# Patient Record
Sex: FEMALE | Race: WHITE
[De-identification: names, ages, dates, MRNs, and addresses within clinical notes are randomized per-mention and may not be internally consistent; named-entity substitution may affect disease eponyms.]

---

## 2017-01-25 NOTE — ECWPNPC
PATIENT NAME: MOR SANCHEZ

: 1965

GENDER: FEMALE

MRN: [id number]

VISIT DATE: 01/10/2017

DISCHARGE DATE: 01/10/17 1618

VISIT LOCKED DATE TIME: 

PHYSICIAN: BIGG THAPA 

PHYSICIAN PAGER NO: [phone]

RESOURCE: BIGG THAPA 

 

           

           

REASON FOR APPOINTMENT

           

          1. RIGHT KNEE

           

HISTORY OF PRESENT ILLNESS

           

      HISTORY OF PRESENT ILLNESS:

      PAIN

           

           

          THE PATIENT DESCRIBES THE PAIN...

           

      FALL RISK SCREENING:

      SCREENING

           

           

          :NO FALLS IN THE PAST YEAR

           

      TODAY'S VISIT:

      NOTES:

          RATES PAIN TODAY AS 2/10. STATES PAIN HAS IMPROVED AS SHE HAS

          BEEN RESTING THE KNEE MORE AND HAS NOT BEEN BUCYCLING. SAW DR CASTANEDA WHO FEELS SHE MAY HAVE DEVELOPED A TENDONITIS IN THE RIGHT

          KNEE/QUAD AREA. RATES HER PAIN TODAY AS 2/10. DESCRIBES IT AS

          INTERMITTTANT AND BURNING...

           

CURRENT MEDICATIONS

           

          TAKING MULTIVITAMINS OTC TABLET 1 TABLET ORALLY ONCE A DAY

          TAKING BIOTIN OTC TABLET 1 TABLET ORALLY ONCE A DAY

          TAKING FLONASE 50 MCG/ACT SUSPENSION 1 PUFF IN EACH NOSTRIL

          NASALLY ONCE A DAY AS NEEDED

          TAKING PENLAC 8 % SOLUTION 1 DROP TO AFFECTED TOE NAILS DAILY,

          CLEAN OFF WITH ALCOHOL EVERY 7 DAYS EXTERNALLY ONCE A DAY, NOTES:

          1 WEEK

          TAKING CLARITIN 10 MG TABLET 1 TABLET ORALLY ONCE A DAY

          TAKING ACYCLOVIR 400 MG TABLET 1 TABLET ORALLY TWICE A DAY

          TAKING LEVOTHYROXINE SODIUM 112 MCG TABLET 1 TABLET ORALLY ONCE A

          DAY

          TAKING OMEPRAZOLE 20MG 20MG TABLET 1 TAB BEFORE MEAL ORAL ONCE A

          DAY

          TAKING BLACK COHOSH 40 MG CAPSULE ORALLY

          TAKING ZOFRAN ODT 4 MG TABLET DISPERSIBLE 1 TABLET ON THE TONGUE

          AND ALLOW TO DISSOLVE ORALLY EVERY 8 HRS PRN NAUSEA

          TAKING ASPIRIN CHILDRENS 81 MG TABLET CHEWABLE 1 TABLET ORALLY

          ONCE A DAY

          TAKING PERCOCET 5-325 MG TABLET 1 TABLET ORALLY ONCE PER DAY

          MDD=1 FOR PAIN AS NEEDED

          TAKING VALTREX 500MG TABLET 1 TAB PLANNED PARENTHOOD ORALLY EVERY

          12 HOURS

          NOT-TAKING PROBIOTIC OTC CAPSULE 1 TABLET ORALLY ONCE A DAY,

          NOTES: 1 WEEK

          NOT-TAKING VALACYCLOVIR  MG TABLET TAKE ONE TABLET BY

          MOUTH TWO TIMES A DAY

          NOT-TAKING NAPROXEN 500 MG TABLET 1 TABLET AS NEEDED ORALLY EVERY

          12 HOURS

          NOT-TAKING PREMPRO 0.625-2.5 MG TABLET 1 TABLET ORALLY ONCE A DAY

          NOT-TAKING MOBIC 7.5 MG TABLET 1 TABLET ORALLY TWICE/DAY

          NOT-TAKING ORTHO TRI-CYCLEN LO 0.025 MG TABLET 1 TAB PLANNED

          PARENTHOOD ORALLY ONCE A DAY, NOTES: 0700

          MEDICATION LIST REVIEWED AND RECONCILED WITH THE PATIENT

           

PAST MEDICAL HISTORY

           

          HYPOTHYROIDISM

          ESOPHAGEAL REFLUX

          HS 2

          DEPRESSION/ANXIETY

          SUBSTANCE ABUSE

          BARTHOLIN'S GLAND CYST

          SKIN CA NOSE--? BCCA

          ECHO 5/15: NORMAL (EF75%, NO ABNL)

          HOLTER 7/15: NORMAL, WAS IN NSR DURING SYMPTOMS

          ABPM 8/15:MEAN SBP 130S

          +RF , MILD RA SX (SEE  NOTE)

          DERMOID CYST RT PELVIS CT 

          SJOGREN'S

           

ALLERGIES

           

          DEMEROL: NAUSEA/VOMITING: ALLERGY

          REGLAN: EKG ABNORMALITIES: ALLERGY

          PREMPRO: PALPITATIONS: SIDE EFFECTS

           

SOCIAL HISTORY

           

          GENERAL:

           

          TOBACCO USE

          ARE YOU A:NONSMOKER

           

           

          LEARNING BARRIERS / SPECIAL NEEDS ORIENTED TO PLAN OF CARE:

          PATIENT, PAIN MANAGEMENT PATIENT, ORIENTED TO PLAN OF CARE:

          PATIENT, PAIN MANAGEMENT PATIENT.

           

           

          NEW PATIENT PAIN DIARY

          TODAY'S VISITNOTES

          FROM 0-10, WHAT LEVEL IS YOUR PAIN TODAY?0

           

           

          PAIN CLINIC PFS, CLERGY, PUBLIC HEALTH REFERRALS

          PFS REFERRAL NEEDED?NO

          CLERGY REFERRAL NEEDED?NO

          PUBLIC HEALTH REFERRAL NEEDED?NO

          WAS THE PROVIDER NOTIFIED OF ANY PERTINENT INFO?NO

          PFS REFERRAL NEEDED?NO

          CLERGY REFERRAL NEEDED?NO

          PUBLIC HEALTH REFERRAL NEEDED?NO

          WAS THE PROVIDER NOTIFIED OF ANY PERTINENT INFO?NO

           

REVIEW OF SYSTEMS

           

      CONSTITUTIONAL:

           

          ANY CHANGE IN YOUR MEDICAL CONDITION? NO . CHILLS NO . FEVER NO .

           

      INFECTION:

           

          DO YOU HAVE NEW INFECTIONS? NO . DO YOU HAVE HISTORY OF MRSA? NO

          .

           

      MUSCULOSKELETAL:

           

          ANY NEW PATTERNS OF PAIN OR NUMBNESS? NO .

           

      GASTROENTEROLOGY:

           

          ANY NEW CHANGE IN BOWEL CONTROL? NO .

           

      GENITOURINARY:

           

          ANY NEW CHANGE IN BLADDER CONTROL? NO . IS THERE A CHANCE YOU

          COULD BE PREGNANT? NO .

           

      HEMATOLOGY/LYMPH:

           

          DO YOU TAKE ANY BLOOD THINNERS? (FOR EXAMPLE- COUMADIN, PLAVIX,

          AGGRENOX, PLATEL, PRADAXA, OR XARELTO) NO . WHEN WAS YOUR LAST

          DOSE? DATE: TIME: .

           

      NEUROLOGY:

           

          HAVE YOU FALLEN IN THE PAST 6 MONTHS? NO . ANY NEW EXTREMITY

          NUMBNESS OR WEAKNESS? NO .

           

      CARDIOLOGY:

           

          DO YOU HAVE A PACEMAKER OR DEFIBRILLATOR? NO . CHEST PAIN PATIENT

          DENIES .

           

      RESPIRATORY:

           

          HAVE YOU BEEN SICK IN THE PAST WEEK? NO . FEVER NO . FLU LIKE

          SYMPTOMS? NO . COUGH NO .

           

      INTEGUMENTARY:

           

          DO YOU HAVE ANY RASHES OR OPEN SORES? NO .

           

      ALLERGIC/IMMUNO:

           

          ARE YOU ALLERGIC TO SHELLFISH OR IV DYE? NO . ANY NEW ALLERGIES?

          NO .

           

      PSYCHIATRIC:

           

          DO YOU HAVE THOUGHTS OF HURTING YOURSELF OR SOMEONE ELSE? NO .

          ARE YOU ABUSED, NEGLECTED, OR IN AN UNSAFE ENVIRONMENT? NO .

           

      ENDOCRINOLOGY:

           

          ARE YOU DIABETIC? NO .

           

      OTHER:

           

          DO YOU NEED ANY PRESCRIPTIONS? YES PERCOCET . IF YES, PLEASE

          LIST: ____ . ANY NEW PROBLEMS WITH YOUR MEDICATIONS? NO . WHEN

          DID YOU LAST EAT? ____ . WHEN DID YOU LAST DRINK? ____ . WHAT DID

          YOU LAST DRINK? ____ . NAME OF PERSON DRIVING YOU HOME? ____ . DO

          YOU HAVE ANY OTHER QUESTIONS OR CONCERNS NO .

           

      FEMALE REPRODUCTIVE:

           

          GYNECOLOGIST WILL BE HAVING A HYSTERECTOMY IN 2017. .

           

      REVIEWED BY:

           

          PROVIDER: BIGG HERNADEZ .

           

VITAL SIGNS

           

           LBS, HT 63 IN, BMI 20.19 INDEX, /50 MM HG, HR 88

          /MIN, RR 16 /MIN, TEMP 98.2 F, OXYGEN SAT % 96, SAFE IN ENV?

          (Y/N) Y, REVIEWED BY: .

           

EXAMINATION

           

      GENERAL EXAMINATION:

          PSYCHALERT , ORIENTED X 3 , APPROPRIATE MOOD AND AFFECT,

          FRUSTRATED .

           

          LUNGS:CLEAR TO AUSCULTATION BILATERALLY.

           

          HEART:HEART RATE REGULAR.

           

          MUSCULOSKELETAL:PALPATION: POSITIVE FOR PAIN OVER L/S SPINE.

          POSITIVE FOR PAIN OVER L/S PARSPINALS, TRIGGER POINTS:, ELICITED

          WITH PALPATION OVER LUMBAR PARAVERTEBRAL MUSCLES AND INTO THE

          SECRUM. RESTRICTION OF ROM IN THIS AREA. GAIT ANTALGIC.

           

          JOINTS:RIGHT , KNEE , TENDER, WITH RANGE OF MOTION . NO WARMTH OR

          SWELLING NOTED TODAY.

           

ASSESSMENTS

           

          RHEUMATOID FACTOR POSITIVE - R76.8 (PRIMARY)

           

          MYALGIA - M79.1

           

          KNEE PAIN, RIGHT - M25.561

           

TREATMENT

           

      KNEE PAIN, RIGHT

          REFILL PERCOCET TABLET, 5-325 MG, 1 TABLET, ORALLY, ONCE PER DAY

          MDD=1 FOR PAIN AS NEEDED, 30 DAYS, 20, REFILLS 0

          NOTES: STRETCH BEFORE BIKING. ICE TO KNEE AS NEEDED.

           

PROCEDURE CODES

           

           ESTABILISHED PATIENT [facility] FACILITY CHARGE

           

FOLLOW UP

           

          LATE MARCH

           

 

ELECTRONICALLY SIGNED BY LUCA LYNN ON

          2017 AT 02:38 PM EST

           

           

           

 

DISCLAIMER :

THIS IS A VISIT SUMMARY EXTRACTED FROM THE [employer] CHART.

IT IS NOT A COPY OF THE [employer] PROGRESS NOTE.

DESMOND

## 2017-03-25 NOTE — ECWPNPC
PATIENT NAME: MOR SANCHEZ

: 1965

GENDER: FEMALE

MRN: [id number]

VISIT DATE: 2017

DISCHARGE DATE: 17 1506

VISIT LOCKED DATE TIME: 

PHYSICIAN: BIGG THAPA 

PHYSICIAN PAGER NO: [phone]

RESOURCE: BIGG THAPA 

 

           

           

REASON FOR APPOINTMENT

           

          1. RIGHT KNEE

           

HISTORY OF PRESENT ILLNESS

           

      HISTORY OF PRESENT ILLNESS:

      PAIN

           

           

          THE PATIENT DESCRIBES THE PAIN...

           

      FALL RISK SCREENING:

      SCREENING

           

           

          :NO FALLS IN THE PAST YEAR

           

      TODAY'S VISIT:

      NOTES:

          RATES PAIN TODAY AS 5/10. PAIN CENTERED ACROSS LOW BACK, RIGHT

          SHOULDERBLADE AND ACROSS NECK/SHOULDER GIRDLE. NOTES SHOULDERS

          ARE STIFF. KNEES ARE NOT TOO PAINFUL - HAS NOT BEEN BIKING AS

          BEFORE. .

           

CURRENT MEDICATIONS

           

          TAKING MULTIVITAMINS OTC TABLET 1 TABLET ORALLY ONCE A DAY

          TAKING BIOTIN OTC TABLET 1 TABLET ORALLY ONCE A DAY

          TAKING FLONASE 50 MCG/ACT SUSPENSION 1 PUFF IN EACH NOSTRIL

          NASALLY ONCE A DAY AS NEEDED

          TAKING PENLAC 8 % SOLUTION 1 DROP TO AFFECTED TOE NAILS DAILY,

          CLEAN OFF WITH ALCOHOL EVERY 7 DAYS EXTERNALLY ONCE A DAY, NOTES:

          1 WEEK

          TAKING CLARITIN 10 MG TABLET 1 TABLET ORALLY ONCE A DAY

          TAKING LEVOTHYROXINE SODIUM 88 MCG TABLET 1 TABLET ORALLY ONCE A

          DAY

          TAKING BLACK COHOSH 40 MG CAPSULE ORALLY DAILY

          TAKING ZOFRAN ODT 4 MG TABLET DISPERSIBLE 1 TABLET ON THE TONGUE

          AND ALLOW TO DISSOLVE ORALLY EVERY 8 HRS PRN NAUSEA

          TAKING ASPIRIN CHILDRENS 81 MG TABLET CHEWABLE 1 TABLET ORALLY

          ONCE A DAY

          TAKING PERCOCET 5-325 MG TABLET 1 TABLET ORALLY ONCE PER DAY

          MDD=1 FOR PAIN AS NEEDED

          TAKING ACYCLOVIR 400 MG TABLET 1 TABLET ORALLY TWICE A DAY

          TAKING VALTREX 500MG TABLET 1 TAB PLANNED PARENTHOOD ORALLY BID

          TAKING OMEPRAZOLE 20MG 20MG TABLET 1 TAB BEFORE MEAL ORAL ONCE A

          DAY

          TAKING ACIDOPHILUS - TABLET 1 TAB ORALLY DAILY

          NOT-TAKING OMEPRAZOLE 20 MG CAPSULE DELAYED RELEASE TAKE ONE

          CAPSULE BY MOUTH EVERY DAY BEFORE A MEAL

          MEDICATION LIST REVIEWED AND RECONCILED WITH THE PATIENT

           

PAST MEDICAL HISTORY

           

          HYPOTHYROIDISM

          ESOPHAGEAL REFLUX

          HS 2

          DEPRESSION/ANXIETY

          SUBSTANCE ABUSE

          BARTHOLIN'S GLAND CYST

          SKIN CA NOSE--? BCCA

          ECHO 5/15: NORMAL (EF75%, NO ABNL)

          HOLTER 7/15: NORMAL, WAS IN NSR DURING SYMPTOMS

          ABPM 8/15:MEAN SBP 130S

          +RF , MILD RA SX (SEE  NOTE)

          DERMOID CYST RT PELVIS CT 2016

          SJOGREN'S

           

ALLERGIES

           

          DEMEROL: NAUSEA/VOMITING: ALLERGY

          REGLAN: EKG ABNORMALITIES: ALLERGY

          PREMPRO: PALPITATIONS: SIDE EFFECTS

           

SURGICAL HISTORY

           

          TONSILLECTOMY

          COLPOSCOPY

          LEEP -(PLANNED PARENTHOOD) 

          HYSTERECTOMY 17

           

SOCIAL HISTORY

           

          GENERAL:

           

          TOBACCO USE

          ARE YOU A:NONSMOKER

           

           

          LEARNING BARRIERS / SPECIAL NEEDS ORIENTED TO PLAN OF CARE:

          PATIENT, PAIN MANAGEMENT PATIENT, ORIENTED TO PLAN OF CARE:

          PATIENT, PAIN MANAGEMENT PATIENT.

           

           

          NEW PATIENT PAIN DIARY

          TODAY'S VISITNOTES

          FROM 0-10, WHAT LEVEL IS YOUR PAIN TODAY?0

           

           

          PAIN CLINIC PFS, CLERGY, PUBLIC HEALTH REFERRALS

          PFS REFERRAL NEEDED?NO

          CLERGY REFERRAL NEEDED?NO

          PUBLIC HEALTH REFERRAL NEEDED?NO

          WAS THE PROVIDER NOTIFIED OF ANY PERTINENT INFO?NO

          PFS REFERRAL NEEDED?NO

          CLERGY REFERRAL NEEDED?NO

          PUBLIC HEALTH REFERRAL NEEDED?NO

          WAS THE PROVIDER NOTIFIED OF ANY PERTINENT INFO?NO

           

REVIEW OF SYSTEMS

           

      CONSTITUTIONAL:

           

          ANY CHANGE IN YOUR MEDICAL CONDITION? YES, HYSTERECTOMY 17 .

          CHILLS NO . FEVER NO .

           

      INFECTION:

           

          DO YOU HAVE NEW INFECTIONS? NO . DO YOU HAVE HISTORY OF MRSA? NO

          .

           

      MUSCULOSKELETAL:

           

          ANY NEW PATTERNS OF PAIN OR NUMBNESS? NO .

           

      GASTROENTEROLOGY:

           

          ANY NEW CHANGE IN BOWEL CONTROL? NO .

           

      GENITOURINARY:

           

          ANY NEW CHANGE IN BLADDER CONTROL? NO . IS THERE A CHANCE YOU

          COULD BE PREGNANT? NO .

           

      HEMATOLOGY/LYMPH:

           

          DO YOU TAKE ANY BLOOD THINNERS? (FOR EXAMPLE- COUMADIN, PLAVIX,

          AGGRENOX, PLATEL, PRADAXA, OR XARELTO) NO . WHEN WAS YOUR LAST

          DOSE? DATE: TIME: .

           

      NEUROLOGY:

           

          HAVE YOU FALLEN IN THE PAST 6 MONTHS? NO . ANY NEW EXTREMITY

          NUMBNESS OR WEAKNESS? NO .

           

      CARDIOLOGY:

           

          DO YOU HAVE A PACEMAKER OR DEFIBRILLATOR? NO .

           

      RESPIRATORY:

           

          HAVE YOU BEEN SICK IN THE PAST WEEK? NO . FEVER NO . FLU LIKE

          SYMPTOMS? NO . COUGH NO .

           

      INTEGUMENTARY:

           

          DO YOU HAVE ANY RASHES OR OPEN SORES? NO .

           

      ALLERGIC/IMMUNO:

           

          ARE YOU ALLERGIC TO SHELLFISH OR IV DYE? NO . ANY NEW ALLERGIES?

          NO .

           

      PSYCHIATRIC:

           

          DO YOU HAVE THOUGHTS OF HURTING YOURSELF OR SOMEONE ELSE? NO .

          ARE YOU ABUSED, NEGLECTED, OR IN AN UNSAFE ENVIRONMENT? NO .

           

      ENDOCRINOLOGY:

           

          ARE YOU DIABETIC? NO .

           

      OTHER:

           

          DO YOU NEED ANY PRESCRIPTIONS? YES . IF YES, PLEASE LIST:

          OXYCODONE 5/325 MG . ANY NEW PROBLEMS WITH YOUR MEDICATIONS? NO .

          WHEN DID YOU LAST EAT? ____ . WHEN DID YOU LAST DRINK? ____ .

          WHAT DID YOU LAST DRINK? ____ . NAME OF PERSON DRIVING YOU HOME?

          ____ . DO YOU HAVE ANY OTHER QUESTIONS OR CONCERNS YES, WOULD

          LIKE TO SCHEDULE PAIN INJECTION IN BACK .

           

      FEMALE REPRODUCTIVE:

           

          GYNECOLOGIST HYSTERECTOMY 17 - HAD 3 LARGE CYSTS ON OVARY AND

          ONE UNDER THE BLADDER. HAS HAS SOME SEVERE HOT FLASHES. IS

          CONSIDERING HRT .

           

      REVIEWED BY:

           

          PROVIDER: BIGG HERNADEZ .

           

VITAL SIGNS

           

          .2 LBS, HT 63 IN, BMI 20.40 INDEX, /74 MM HG, HR 94

          /MIN, RR 16 /MIN, TEMP 99.1 F, OXYGEN SAT % 99%, NA INITIALS SC

          14:08, REVIEWED BY: MAKI.

           

EXAMINATION

           

      GENERAL EXAMINATION:

          PSYCHALERT , ORIENTED X 3 , APPROPRIATE MOOD AND AFFECT,

          FRUSTRATED .

           

          LUNGS:CLEAR TO AUSCULTATION BILATERALLY.

           

          HEART:HEART RATE REGULAR.

           

          MUSCULOSKELETAL:PALPATION: POSITIVE FOR PAIN OVER L/S SPINE.

          POSITIVE FOR PAIN OVER L/S PARSPINALS, TRIGGER POINTS:, ELICITED

          WITH PALPATION OVER LUMBAR PARAVERTEBRAL MUSCLES AND INTO THE

          SECRUM. RESTRICTION OF ROM IN THIS AREA. GAIT ANTALGIC.

           

          JOINTS:RIGHT , KNEE , TENDER, WITH RANGE OF MOTION . NO WARMTH OR

          SWELLING NOTED TODAY.

           

ASSESSMENTS

           

          MYALGIA - M79.1 (PRIMARY)

           

          CERVICALGIA - M54.2

           

TREATMENT

           

      MYALGIA

          REFILL PERCOCET TABLET, 5-325 MG, 1 TABLET, ORALLY, ONCE PER DAY

          MDD=1 FOR PAIN AS NEEDED, 30 DAYS, 20, REFILLS 0

          TRIGGER POINT 3 + BIGG CAPPS 3/21/2017 2:52:26 PM >

          MID AND LOW BACK BILATERAL

          NOTES: EXERCISE AS TOLERATED.

          CLINICAL NOTES: ISTOP REGISTRY REVIEWED AND DEMNOSTRATES

          COMPLLIANCE. BRINGS IN MEDICATIONS WHICH IS APPROPRIATE FOR WHAT

          WAS DISPENSED. RECENT URINE TOXICOLOGY REVIEWED. NO UNAUTHORIZED

          MEDICATIONS. NO ILLICIT SUBSTANCES AND PRESCRIBED MEDICATIONS

          WERE PRESENT.

           

PROCEDURE CODES

           

           ESTABILISHED PATIENT [facility] CHARGE

           

DISPOSITION & COMMUNICATION

           

FOLLOW UP

           

          AFTER INJECTION (REASON: CHECK AUTH FOR TPI LOW BACK)

           

 

ELECTRONICALLY SIGNED BY LUCA LYNN ON

          2017 AT 06:38 PM EDT

           

           

           

 

DISCLAIMER :

THIS IS A VISIT SUMMARY EXTRACTED FROM THE [employer] CHART.

IT IS NOT A COPY OF THE [employer] PROGRESS NOTE.

DESMOND

## 2017-04-24 NOTE — ECWPNPC
PATIENT NAME: MOR SANCHEZ

: 1965

GENDER: FEMALE

MRN: [id number]

VISIT DATE: 2017

DISCHARGE DATE: 17 1621

VISIT LOCKED DATE TIME: 

PHYSICIAN: RON RUSSELL  

PHYSICIAN PAGER NO: [phone]

RESOURCE: RON RUSSELL  

 

           

           

REASON FOR APPOINTMENT

           

          1. TPI

           

HISTORY OF PRESENT ILLNESS

           

      HISTORY OF PRESENT ILLNESS:

      PAIN

           

           

          THE PATIENT DESCRIBES THE PAIN...

           

      FALL RISK SCREENING:

      SCREENING

           

           

          :NO FALLS IN THE PAST YEAR

           

CURRENT MEDICATIONS

           

          TAKING MULTIVITAMINS OTC TABLET 1 TABLET ORALLY ONCE A DAY,

          NOTES: 121

          TAKING BIOTIN OTC TABLET 1 TABLET ORALLY ONCE A DAY, NOTES: 730

          TAKING FLONASE 50 MCG/ACT SUSPENSION 1 PUFF IN EACH NOSTRIL

          NASALLY ONCE A DAY AS NEEDED, NOTES: 730

          TAKING CLARITIN 10 MG TABLET 1 TABLET ORALLY ONCE A DAY, NOTES:

          17@2100

          TAKING LEVOTHYROXINE SODIUM 88 MCG TABLET 1 TABLET ORALLY ONCE A

          DAY, NOTES: 730

          TAKING BLACK COHOSH 40 MG CAPSULE ORALLY DAILY, NOTES: 730

          TAKING ZOFRAN ODT 4 MG TABLET DISPERSIBLE 1 TABLET ON THE TONGUE

          AND ALLOW TO DISSOLVE ORALLY EVERY 8 HRS PRN NAUSEA, NOTES: 1

          MONTH AGO

          TAKING ASPIRIN CHILDRENS 81 MG TABLET CHEWABLE 1 TABLET ORALLY

          ONCE A DAY, NOTES: 17@2200

          TAKING ACYCLOVIR 400 MG TABLET 1 TABLET ORALLY TWICE A DAY,

          NOTES: 730

          TAKING VALTREX 500MG TABLET 1 TAB PLANNED PARENTHOOD ORALLY BID,

          NOTES: 1 WEEK

          TAKING OMEPRAZOLE 20MG 20MG TABLET 1 TAB BEFORE MEAL ORAL ONCE A

          DAY, NOTES: 

          TAKING ACIDOPHILUS - TABLET 1 TAB ORALLY DAILY, NOTES: 730

          TAKING PERCOCET 5-325 MG TABLET 1 TABLET ORALLY ONCE PER DAY

          MDD=1 FOR PAIN AS NEEDED, NOTES: 2 DAYS AGO

          TAKING PENLAC 8% SOLUTION 1 DROP TO AFFECTED TOE NAILS DAILY,

          CLEAN OFF WITH ALCOHOL EVERY 7 DAYS EXTERNALLY ONCE A DAY, NOTES:

          1 MONTH AGO

          NOT-TAKING OMEPRAZOLE 20 MG CAPSULE DELAYED RELEASE TAKE ONE

          CAPSULE BY MOUTH EVERY DAY BEFORE A MEAL

          MEDICATION LIST REVIEWED AND RECONCILED WITH THE PATIENT

           

PAST MEDICAL HISTORY

           

          HYPOTHYROIDISM

          ESOPHAGEAL REFLUX

          HS 2

          DEPRESSION/ANXIETY

          SUBSTANCE ABUSE

          BARTHOLIN'S GLAND CYST

          SKIN CA NOSE--? BCCA

          ECHO 5/15: NORMAL (EF75%, NO ABNL)

          HOLTER 7/15: NORMAL, WAS IN NSR DURING SYMPTOMS

          ABPM 8/15:MEAN SBP 130S

          +RF , MILD RA SX (SEE  NOTE)

          DERMOID CYST RT PELVIS CT 2016

          SJOGREN'S

           

ALLERGIES

           

          DEMEROL: NAUSEA/VOMITING: ALLERGY

          REGLAN: EKG ABNORMALITIES: ALLERGY

          PREMPRO: PALPITATIONS: SIDE EFFECTS

           

SOCIAL HISTORY

           

          GENERAL:

           

          PAIN CLINIC PFS, CLERGY, PUBLIC HEALTH REFERRALS

          CLERGY REFERRAL NEEDED?NO

          WAS THE PROVIDER NOTIFIED OF ANY PERTINENT INFO?NO

          PFS REFERRAL NEEDED?NO

          PUBLIC HEALTH REFERRAL NEEDED?NO

           

           

          PATIENT: ____.

           

REVIEW OF SYSTEMS

           

      CONSTITUTIONAL:

           

          ANY CHANGE IN YOUR MEDICAL CONDITION? NO . CHILLS NO . FEVER NO .

           

      INFECTION:

           

          DO YOU HAVE NEW INFECTIONS? NO . DO YOU HAVE HISTORY OF MRSA? NO

          .

           

      MUSCULOSKELETAL:

           

          ANY NEW PATTERNS OF PAIN OR NUMBNESS? NO .

           

      GASTROENTEROLOGY:

           

          ANY NEW CHANGE IN BOWEL CONTROL? NO .

           

      GENITOURINARY:

           

          ANY NEW CHANGE IN BLADDER CONTROL? NO . IS THERE A CHANCE YOU

          COULD BE PREGNANT? NO .

           

      HEMATOLOGY/LYMPH:

           

          DO YOU TAKE ANY BLOOD THINNERS? (FOR EXAMPLE- COUMADIN, PLAVIX,

          AGGRENOX, PLATEL, PRADAXA, OR XARELTO) NO . WHEN WAS YOUR LAST

          DOSE? DATE: TIME: .

           

      NEUROLOGY:

           

          HAVE YOU FALLEN IN THE PAST 6 MONTHS? NO . ANY NEW EXTREMITY

          NUMBNESS OR WEAKNESS? NO .

           

      CARDIOLOGY:

           

          DO YOU HAVE A PACEMAKER OR DEFIBRILLATOR? NO .

           

      RESPIRATORY:

           

          HAVE YOU BEEN SICK IN THE PAST WEEK? NO . FEVER NO . FLU LIKE

          SYMPTOMS? NO . COUGH NO .

           

      INTEGUMENTARY:

           

          DO YOU HAVE ANY RASHES OR OPEN SORES? NO .

           

      ALLERGIC/IMMUNO:

           

          ARE YOU ALLERGIC TO SHELLFISH OR IV DYE? NO . ANY NEW ALLERGIES?

          NO .

           

      PSYCHIATRIC:

           

          DO YOU HAVE THOUGHTS OF HURTING YOURSELF OR SOMEONE ELSE? NO .

          ARE YOU ABUSED, NEGLECTED, OR IN AN UNSAFE ENVIRONMENT? NO .

           

      ENDOCRINOLOGY:

           

          ARE YOU DIABETIC? NO .

           

      OTHER:

           

          DO YOU NEED ANY PRESCRIPTIONS? NO . IF YES, PLEASE LIST: ____ .

          ANY NEW PROBLEMS WITH YOUR MEDICATIONS? NO . WHEN DID YOU LAST

          EAT? ____0930 . WHEN DID YOU LAST DRINK? ____1330 . WHAT DID YOU

          LAST DRINK? ____VEGTABLE BROTH AND WATER . NAME OF PERSON DRIVING

          YOU HOME? ____KEN . DO YOU HAVE ANY OTHER QUESTIONS OR CONCERNS

          NO .

           

      REVIEWED BY:

           

          PROVIDER: _____ .

           

VITAL SIGNS

           

           LBS, HT 63 IN, BMI 20.37 INDEX, /69 MM HG, HR 70

          /MIN, RR 16 /MIN, TEMP 98.4 F, OXYGEN SAT % 99%, NA INITIALS SC

          15:28.

           

ASSESSMENTS

           

          MYALGIA - M79.1 (PRIMARY)

           

PROCEDURES

           

      PN TRIGGER POINT INJECTION WITH STEROIDS

          PRE PROCEDURE DIAGNOSIS 1. MYALGIA 2. PAIN AT BILATERAL NECK AREA

          AND BILATERAL SHOULDER AREA

          POST PROCEDURE DIAGNOSIS 1. MYALGIA 2. PAIN AT BILATERAL NECK

          AREA AND BILATERAL SHOULDER AREA

          PROCEDURE TRIGGER POINT INJECTION AT BILATERAL NECK AREA AND

          BILATERAL SHOULDER AREA

          SURGEON DR. RON RUSSELL

          ASSISTANT NONE

          ANESTHESIA LOCAL

          PRE PROCEDURE NOTE THE PATIENT HAS A HISTORY OF CHRONIC PAIN AT

          THE RIGHT AND LEFT NECK AREA AND RIGHT AND LEFT SHOULDER AREA. I

          EVALUATE THE PATIENT AND REVIEWED THE CHART. THERE IS EVIDENCE OF

          BANDS OF TISSUE WITH RESTRICTION OF MOVEMENT AND PRESENCE OF

          TRIGGER POINT AT THE AFFECTED AREA. I WENT OVER THE RISKS,

          ALTERNATIVES, AND BENEFITS ASSOCIATED WITH THIS PROCEDURE. THE

          PATIENT WOULD LIKE TO PROCEED AND GIVE CONSENT TO PERFORMED THE

          PROCEDURE. THE PATIENT DENIES UNEXPLAINABLE WEIGHT LOSS, FEVER,

          CHILLS, OR NEW CHANGES IN URINARY OR BOWEL CONTROL

          DESCRIPTION OF PROCEDURE THE PATIENT WAS BROUGHT TO THE PROCEDURE

          ROOM AND PLACED IN THE SITTING POSITION. THE AREA WAS CLEANED

          WITH ALCOHOL. THE PROCEDURE WAS DONE USING ASEPTIC STERILE

          TECHNIQUE. I CHECKED LATERALITY AND THE LEVEL WHERE THE PROCEDURE

          WAS GOING TO BE PERFORMED WITH THE PATIENT AND THE SUPPORTING

          STAFF AT THE MOMENT OF THE TIME OUT IN THE PROCEDURE ROOM. USING

          A 25-GAUGE NEEDLE, TRIGGER POINTS WERE INJECTED AT THE RIGHT AND

          LEFT NECK AREA AND RIGHT AND LEFT SHOULDER AREA WITH A TOTAL OF

          40 ML OF BUPIVACAINE 0.25% AND KENALOG 40 MG. THERE WAS NO

          EVIDENCE OF BLOOD, PARESTHESIA OR CEREBROSPINAL FLUID DURING THE

          PROCEDURE. THE PATIENT WAS SENT TO THE RECOVERY ROOM. THE PATIENT

          WAS MOVING THE EXTREMITIES AND DOING WELL. THERE WAS NO

          COMPLICATION DURING THE PROCEDURE

          POST PROCEDURE NOTE THE PATIENT WILL BE SEEN IN A FOLLOW UP IN

          THE NEXT FEW WEEKS. INSTRUCTIONS WERE GIVEN, QUESTIONS WERE

          ANSWERED, AND THE PATIENT EXPRESSED UNDERSTANDING AND AGREES WITH

          THE PLAN. I, ILLY CHILDRESS, DOCUMENTED THE ABOVE INFORMATION

          ACTING AS A SCRIBE FOR DR. RUSSELL. I HAVE REVIEWED THE ABOVE

          DOCUMENT, WRITTEN BY LILY CHILDRESS SCRIBCAIT AND I VERIFY THAT IT IS

          ACCURATE.

           

PROCEDURE CODES

           

          60930 INJECT TRIGGER POINTS 3/>

           

DISPOSITION & COMMUNICATION

           

FOLLOW UP

           

          3 WEEKS

           

 

ELECTRONICALLY SIGNED BY RON RUSSELL MD ON

          2017 AT 05:58 PM EDT

           

           

           

 

DISCLAIMER :

THIS IS A VISIT SUMMARY EXTRACTED FROM THE [employer] CHART.

IT IS NOT A COPY OF THE [employer] PROGRESS NOTE.

DESMOND

## 2017-05-29 NOTE — ECWPNPC
PATIENT NAME: MOR SANCHEZ

: 1965

GENDER: FEMALE

MRN: [id number]

VISIT DATE: 2017

DISCHARGE DATE: 17 1149

VISIT LOCKED DATE TIME: 

PHYSICIAN: BIGG THAPA 

PHYSICIAN PAGER NO: [phone]

RESOURCE: BIGG THAPA 

 

           

           

HISTORY OF PRESENT ILLNESS

           

      HISTORY OF PRESENT ILLNESS:

      PAIN

           

           

          THE PATIENT DESCRIBES THE PAIN...

           

      FALL RISK SCREENING:

      SCREENING

           

           

          :NO FALLS IN THE PAST YEAR

           

      TODAY'S VISIT:

      NOTES:

          RATES PAIN LEVEL TODAY AS 2/10. REPORTS HER HIP AND BACK PAIN ARE

          &QUOT;GETTING WORSE&QUOT;. IS S/P TRIGGER POIT INJECTIONS TO

          BILATERAL NECK AND SHOULDERS. REPORTS SOME DISCOMFORT ACROSS THE

          RIGHT SHOULDER AND NECK REGION..

           

CURRENT MEDICATIONS

           

          TAKING MULTIVITAMINS OTC TABLET 1 TABLET ORALLY ONCE A DAY,

          NOTES: 121

          TAKING BIOTIN OTC TABLET 1 TABLET ORALLY ONCE A DAY, NOTES: 730

          TAKING FLONASE 50 MCG/ACT SUSPENSION 1 PUFF IN EACH NOSTRIL

          NASALLY ONCE A DAY AS NEEDED, NOTES: 730

          TAKING CLARITIN 10 MG TABLET 1 TABLET ORALLY ONCE A DAY, NOTES:

          17@2100

          TAKING LEVOTHYROXINE SODIUM 88 MCG TABLET 1 TABLET ORALLY ONCE A

          DAY, NOTES: 730

          TAKING BLACK COHOSH 40 MG CAPSULE ORALLY DAILY, NOTES: 730

          TAKING ZOFRAN ODT 4 MG TABLET DISPERSIBLE 1 TABLET ON THE TONGUE

          AND ALLOW TO DISSOLVE ORALLY EVERY 8 HRS PRN NAUSEA, NOTES: 1

          MONTH AGO

          TAKING ASPIRIN CHILDRENS 81 MG TABLET CHEWABLE 1 TABLET ORALLY

          ONCE A DAY, NOTES: 17@2200

          TAKING ACYCLOVIR 400 MG TABLET 1 TABLET ORALLY TWICE A DAY,

          NOTES: 730

          TAKING VALTREX 500MG TABLET 1 TAB PLANNED PARENTHOOD ORALLY BID,

          NOTES: 1 WEEK

          TAKING OMEPRAZOLE 20MG 20MG TABLET 1 TAB BEFORE MEAL ORAL ONCE A

          DAY, NOTES: 121

          TAKING ACIDOPHILUS - TABLET 1 TAB ORALLY DAILY, NOTES: 730

          TAKING PERCOCET 5-325 MG TABLET 1 TABLET ORALLY ONCE PER DAY

          MDD=1 FOR PAIN AS NEEDED, NOTES: 2 DAYS AGO

          TAKING PENLAC 8% SOLUTION 1 DROP TO AFFECTED TOE NAILS DAILY,

          CLEAN OFF WITH ALCOHOL EVERY 7 DAYS EXTERNALLY ONCE A DAY, NOTES:

          1 MONTH AGO

          NOT-TAKING OMEPRAZOLE 20 MG CAPSULE DELAYED RELEASE TAKE ONE

          CAPSULE BY MOUTH EVERY DAY BEFORE A MEAL

          MEDICATION LIST REVIEWED AND RECONCILED WITH THE PATIENT

           

PAST MEDICAL HISTORY

           

          HYPOTHYROIDISM

          ESOPHAGEAL REFLUX

          HS 2

          DEPRESSION/ANXIETY

          SUBSTANCE ABUSE

          BARTHOLIN'S GLAND CYST

          SKIN CA NOSE--? BCCA

          ECHO 5/15: NORMAL (EF75%, NO ABNL)

          HOLTER 7/15: NORMAL, WAS IN NSR DURING SYMPTOMS

          ABPM 8/15:MEAN SBP 130S

          +RF , MILD RA SX (SEE  NOTE)

          DERMOID CYST RT PELVIS CT 2016

          SJOGREN'S

           

ALLERGIES

           

          DEMEROL: NAUSEA/VOMITING: ALLERGY

          REGLAN: EKG ABNORMALITIES: ALLERGY

          PREMPRO: PALPITATIONS: SIDE EFFECTS

           

REVIEW OF SYSTEMS

           

      CONSTITUTIONAL:

           

          ANY CHANGE IN YOUR MEDICAL CONDITION? NO . CHILLS NO . FEVER NO .

           

      INFECTION:

           

          DO YOU HAVE NEW INFECTIONS? YES,SINUS . DO YOU HAVE HISTORY OF

          MRSA? NO .

           

      MUSCULOSKELETAL:

           

          ANY NEW PATTERNS OF PAIN OR NUMBNESS? YES .

           

      GASTROENTEROLOGY:

           

          ANY NEW CHANGE IN BOWEL CONTROL? NO .

           

      GENITOURINARY:

           

          ANY NEW CHANGE IN BLADDER CONTROL? NO . IS THERE A CHANCE YOU

          COULD BE PREGNANT? NO .

           

      HEMATOLOGY/LYMPH:

           

          DO YOU TAKE ANY BLOOD THINNERS? (FOR EXAMPLE- COUMADIN, PLAVIX,

          AGGRENOX, PLATEL, PRADAXA, OR XARELTO) NO . WHEN WAS YOUR LAST

          DOSE? DATE: TIME: .

           

      NEUROLOGY:

           

          HAVE YOU FALLEN IN THE PAST 6 MONTHS? NO . ANY NEW EXTREMITY

          NUMBNESS OR WEAKNESS? NO .

           

      CARDIOLOGY:

           

          DO YOU HAVE A PACEMAKER OR DEFIBRILLATOR? NO .

           

      RESPIRATORY:

           

          HAVE YOU BEEN SICK IN THE PAST WEEK? NO . FEVER NO . FLU LIKE

          SYMPTOMS? NO . COUGH NO .

           

      INTEGUMENTARY:

           

          DO YOU HAVE ANY RASHES OR OPEN SORES? NO .

           

      ALLERGIC/IMMUNO:

           

          ARE YOU ALLERGIC TO SHELLFISH OR IV DYE? NO . ANY NEW ALLERGIES?

          NO .

           

      PSYCHIATRIC:

           

          DO YOU HAVE THOUGHTS OF HURTING YOURSELF OR SOMEONE ELSE? NO .

          ARE YOU ABUSED, NEGLECTED, OR IN AN UNSAFE ENVIRONMENT? NO .

           

      ENDOCRINOLOGY:

           

          ARE YOU DIABETIC? NO .

           

      OTHER:

           

          DO YOU NEED ANY PRESCRIPTIONS? YES . IF YES, PLEASE LIST:

          ____PERCOCET . ANY NEW PROBLEMS WITH YOUR MEDICATIONS? NO . WHEN

          DID YOU LAST EAT? ____ . WHEN DID YOU LAST DRINK? ____ . WHAT DID

          YOU LAST DRINK? ____ . NAME OF PERSON DRIVING YOU HOME? ____ . DO

          YOU HAVE ANY OTHER QUESTIONS OR CONCERNS YES,HIP AND LOWER BACK

          GETTING WORSE .

           

      REVIEWED BY:

           

          PROVIDER: BIGG HERNADEZ .

           

VITAL SIGNS

           

          .8 LBS, HT 63 IN, BMI 19.98 INDEX, /76 MM HG, HR 79

          /MIN, RR 16 /MIN, TEMP 98.6 F, OXYGEN SAT % 97%, NA INITIALS SC

          10:58, REVIEWED BY: VD.

           

EXAMINATION

           

      GENERAL EXAMINATION:

          PSYCHALERT , ORIENTED X 3 , APPROPRIATE MOOD AND AFFECT,

          FRUSTRATED .

           

          LUNGS:CLEAR TO AUSCULTATION BILATERALLY.

           

          HEART:HEART RATE REGULAR.

           

          MUSCULOSKELETAL:POINT THENDERNESS OVER RIGHT LUMBAR FACETS AND

          SACRUM., TRIGGER POINTS:, ELICITED WITH PALPATION OVER LUMBAR

          PARAVERTEBRAL MUSCLES AND INTO THE SECRUM. RESTRICTION OF ROM IN

          THIS AREA. GAIT ANTALGIC.

           

          JOINTS:RIGHT , KNEE , TENDER, WITH RANGE OF MOTION . NO WARMTH OR

          SWELLING NOTED TODAY.

           

ASSESSMENTS

           

          MYALGIA - M79.1 (PRIMARY)

           

          CERVICALGIA - M54.2

           

          LUMBAR FACET ARTHROPATHY - M12.88

           

          RHEUMATOID ARTHRITIS INVOLVING MULTIPLE SITES WITH POSITIVE

          RHEUMATOID FACTOR - M05.79

           

TREATMENT

           

      MYALGIA

          REFILL PERCOCET TABLET, 5-325 MG, 1 TABLET, ORALLY, ONCE PER DAY

          MDD=1 FOR PAIN AS NEEDED, 30 DAYS, 20, REFILLS 0, NOTES: 2 DAYS

          AGO

          TRIGGER POINT 3 + BIGG CAPPS 2017 11:38:14 AM >

          LOW BACK/SACRUM RIGHT SIDE

          NOTES: USE TENNIS BALL TO APPLY PRESSURE TO TENDER POINTS ON

          BACK/NECK AREA. BRING PAIN MEDS TO EVERY VISIT.

       REFERRAL TO:[facility]

          REASON:POSITIVE RHEUMATOID FACTOR, JOINT PAIN SWELLING

           

PREVENTIVE MEDICINE

           

          TRIGGER POINT INJECTIONS INFORMATION BYRON CORNELL.

           

PROCEDURE CODES

           

           ESTABILISHED PATIENT [facility] FACILITY CHARGE

           

DISPOSITION & COMMUNICATION

           

FOLLOW UP

           

          AFTER INJECTION (REASON: CHECK AUTH FOR TPI LOW BACK, RIGHT)

           

 

ELECTRONICALLY SIGNED BY LUCA LYNN ON

          2017 AT 02:23 PM EDT

           

           

           

 

DISCLAIMER :

THIS IS A VISIT SUMMARY EXTRACTED FROM THE [employer] CHART.

IT IS NOT A COPY OF THE [employer] PROGRESS NOTE.

DESMOND

## 2017-06-23 NOTE — ECWPNPC
PATIENT NAME: MOR SANCHEZ

: 1965

GENDER: FEMALE

MRN: [id number]

VISIT DATE: 06/15/2017

DISCHARGE DATE: 06/15/17 1308

VISIT LOCKED DATE TIME: 

PHYSICIAN: RON RUSSELL  

PHYSICIAN PAGER NO: [phone]

RESOURCE: RON RUSSELL  

 

           

           

REASON FOR APPOINTMENT

           

          1. LOW BACK

           

HISTORY OF PRESENT ILLNESS

           

      HISTORY OF PRESENT ILLNESS:

      PAIN

           

           

          THE PATIENT DESCRIBES THE PAIN...

           

      FALL RISK SCREENING:

      SCREENING

           

           

          :NO FALLS IN THE PAST YEAR

           

CURRENT MEDICATIONS

           

          TAKING MULTIVITAMINS OTC TABLET 1 TABLET ORALLY ONCE A DAY,

          NOTES: 2 DAYS AGO

          TAKING BIOTIN OTC TABLET 1 TABLET ORALLY ONCE A DAY, NOTES: 630

          TAKING FLONASE 50 MCG/ACT SUSPENSION 1 PUFF IN EACH NOSTRIL

          NASALLY ONCE A DAY AS NEEDED, NOTES: 730

          TAKING CLARITIN 10 MG TABLET 1 TABLET ORALLY ONCE A DAY, NOTES:

          17@2200

          TAKING LEVOTHYROXINE SODIUM 88 MCG TABLET 1 TABLET ORALLY ONCE A

          DAY, NOTES: 630

          TAKING BLACK COHOSH 40 MG CAPSULE ORALLY DAILY, NOTES: 630

          TAKING ZOFRAN ODT 4 MG TABLET DISPERSIBLE 1 TABLET ON THE TONGUE

          AND ALLOW TO DISSOLVE ORALLY EVERY 8 HRS PRN NAUSEA, NOTES: 5

          DAYS AGO

          TAKING ASPIRIN CHILDRENS 81 MG TABLET CHEWABLE 1 TABLET ORALLY

          ONCE A DAY, NOTES: 3 WEEKS AGO

          TAKING ACYCLOVIR 400 MG TABLET 1 TABLET ORALLY TWICE A DAY,

          NOTES: 3 DAYS AGO

          TAKING VALTREX 500MG TABLET 1 TAB PLANNED PARENTHOOD ORALLY BID,

          NOTES: 30

          TAKING OMEPRAZOLE 20MG 20MG TABLET 1 TAB BEFORE MEAL ORAL ONCE A

          DAY, NOTES: 3 DAYS AGO

          TAKING ACIDOPHILUS - TABLET 1 TAB ORALLY DAILY, NOTES: 1 MONTH

          AGO

          TAKING PENLAC 8% SOLUTION 1 DROP TO AFFECTED TOE NAILS DAILY,

          CLEAN OFF WITH ALCOHOL EVERY 7 DAYS EXTERNALLY ONCE A DAY, NOTES:

          6 DAYS AGO

          TAKING PERCOCET 5-325 MG TABLET 1 TABLET ORALLY ONCE PER DAY

          MDD=1 FOR PAIN AS NEEDED, NOTES: 2 DAYS AGO

          TAKING ESTROVEN MENOPAUSE RELIEF - CAPSULE ORALLY , NOTES:

          17@1900

          NOT-TAKING OMEPRAZOLE 20 MG CAPSULE DELAYED RELEASE TAKE ONE

          CAPSULE BY MOUTH EVERY DAY BEFORE A MEAL

          MEDICATION LIST REVIEWED AND RECONCILED WITH THE PATIENT

           

PAST MEDICAL HISTORY

           

          HYPOTHYROIDISM

          ESOPHAGEAL REFLUX

          HS 2

          DEPRESSION/ANXIETY

          SUBSTANCE ABUSE

          BARTHOLIN'S GLAND CYST

          SKIN CA NOSE--? BCCA

          ECHO 5/15: NORMAL (EF75%, NO ABNL)

          HOLTER 7/15: NORMAL, WAS IN NSR DURING SYMPTOMS

          ABPM 8/15:MEAN SBP 130S

          +RF , MILD RA SX (SEE  NOTE)

          DERMOID CYST RT PELVIS CT 

          SJOGREN'S

           

ALLERGIES

           

          DEMEROL: NAUSEA/VOMITING: ALLERGY

          REGLAN: EKG ABNORMALITIES: ALLERGY

          PREMPRO: PALPITATIONS: SIDE EFFECTS

           

REVIEW OF SYSTEMS

           

      REVIEWED BY:

           

          PROVIDER: _____ .

           

      CONSTITUTIONAL:

           

          ANY CHANGE IN YOUR MEDICAL CONDITION? NO . CHILLS NO . FEVER NO .

           

      INFECTION:

           

          DO YOU HAVE NEW INFECTIONS? NO . DO YOU HAVE HISTORY OF MRSA? NO

          .

           

      MUSCULOSKELETAL:

           

          ANY NEW PATTERNS OF PAIN OR NUMBNESS? YES .

           

      GASTROENTEROLOGY:

           

          ANY NEW CHANGE IN BOWEL CONTROL? NO .

           

      GENITOURINARY:

           

          ANY NEW CHANGE IN BLADDER CONTROL? NO . IS THERE A CHANCE YOU

          COULD BE PREGNANT? NO .

           

      HEMATOLOGY/LYMPH:

           

          DO YOU TAKE ANY BLOOD THINNERS? (FOR EXAMPLE- COUMADIN, PLAVIX,

          AGGRENOX, PLATEL, PRADAXA, OR XARELTO) NO . WHEN WAS YOUR LAST

          DOSE? DATE: TIME: .

           

      NEUROLOGY:

           

          HAVE YOU FALLEN IN THE PAST 6 MONTHS? NO . ANY NEW EXTREMITY

          NUMBNESS OR WEAKNESS? NO .

           

      CARDIOLOGY:

           

          DO YOU HAVE A PACEMAKER OR DEFIBRILLATOR? NO .

           

      RESPIRATORY:

           

          HAVE YOU BEEN SICK IN THE PAST WEEK? NO . FEVER NO . FLU LIKE

          SYMPTOMS? NO . COUGH NO .

           

      INTEGUMENTARY:

           

          DO YOU HAVE ANY RASHES OR OPEN SORES? NO .

           

      ALLERGIC/IMMUNO:

           

          ARE YOU ALLERGIC TO SHELLFISH OR IV DYE? NO . ANY NEW ALLERGIES?

          NO .

           

      PSYCHIATRIC:

           

          DO YOU HAVE THOUGHTS OF HURTING YOURSELF OR SOMEONE ELSE? NO .

          ARE YOU ABUSED, NEGLECTED, OR IN AN UNSAFE ENVIRONMENT? NO .

           

      ENDOCRINOLOGY:

           

          ARE YOU DIABETIC? NO .

           

      OTHER:

           

          DO YOU NEED ANY PRESCRIPTIONS? YES . IF YES, PLEASE LIST:

          ____OXYCODONE  . ANY NEW PROBLEMS WITH YOUR MEDICATIONS? NO

          . WHEN DID YOU LAST EAT? ____0230 . WHEN DID YOU LAST DRINK?

          ____0850 . WHAT DID YOU LAST DRINK? ____CHICKEN BROTH,WATER .

          NAME OF PERSON DRIVING YOU HOME? ____[employer] . DO YOU HAVE ANY

          OTHER QUESTIONS OR CONCERNS NO .

           

VITAL SIGNS

           

          .4 LBS, HT 63 IN, BMI 19.73 INDEX, /82 MM HG, HR 90

          /MIN, RR 16 /MIN, TEMP 98.2 F, OXYGEN SAT % 100%, NA INITIALS TL

          1103, REVIEWED BY: VD.

           

ASSESSMENTS

           

          MYALGIA - M79.1 (PRIMARY)

           

PROCEDURES

           

      PN TRIGGER POINT INJECTION WITH STEROIDS

          PRE PROCEDURE DIAGNOSIS 1. MYALGIA 2. PAIN AT LEFT THORACIC AREA

          AND LEFT LOWER BACK AREA

          POST PROCEDURE DIAGNOSIS 1. MYALGIA 2. PAIN AT LEFT THORACIC AREA

          AND LEFT LOWER BACK AREA

          PROCEDURE TRIGGER POINT INJECTION AT LEFT THORACIC AREA AND LEFT

          LOWER BACK AREA

          SURGEON DR. RON RUSSELL

          ASSISTANT NONE

          ANESTHESIA LOCAL

          PRE PROCEDURE NOTE THE PATIENT HAS A HISTORY OF CHRONIC PAIN AT

          THE LEFT THORACIC AREA AND LEFT LOWER BACK AREA. I EVALUATE THE

          PATIENT AND REVIEWED THE CHART. THERE IS EVIDENCE OF BANDS OF

          TISSUE WITH RESTRICTION OF MOVEMENT AND PRESENCE OF TRIGGER POINT

          AT THE AFFECTED AREA. I WENT OVER THE RISKS, ALTERNATIVES, AND

          BENEFITS ASSOCIATED WITH THIS PROCEDURE. THE PATIENT WOULD LIKE

          TO PROCEED AND GIVE CONSENT TO PERFORMED THE PROCEDURE. THE

          PATIENT DENIES UNEXPLAINABLE WEIGHT LOSS, FEVER, CHILLS, OR NEW

          CHANGES IN URINARY OR BOWEL CONTROL

          DESCRIPTION OF PROCEDURE THE PATIENT WAS BROUGHT TO THE PROCEDURE

          ROOM AND PLACED IN THE SITTING POSITION. THE AREA WAS CLEANED

          WITH ALCOHOL. THE PROCEDURE WAS DONE USING ASEPTIC STERILE

          TECHNIQUE. I CHECKED LATERALITY AND THE LEVEL WHERE THE PROCEDURE

          WAS GOING TO BE PERFORMED WITH THE PATIENT AND THE SUPPORTING

          STAFF AT THE MOMENT OF THE TIME OUT IN THE PROCEDURE ROOM. USING

          A 25-GAUGE NEEDLE, TRIGGER POINTS WERE INJECTED AT THE LEFT

          THORACIC AREA AND LEFT LOWER BACK AREA WITH A TOTAL OF 40 ML OF

          BUPIVACAINE 0.25% AND KENALOG 40 MG. THERE WAS NO EVIDENCE OF

          BLOOD, PARESTHESIA OR CEREBROSPINAL FLUID DURING THE PROCEDURE.

          THE PATIENT WAS SENT TO THE RECOVERY ROOM. THE PATIENT WAS MOVING

          THE EXTREMITIES AND DOING WELL. THERE WAS NO COMPLICATION DURING

          THE PROCEDURE

          POST PROCEDURE NOTE THE PATIENT WILL BE SEEN IN A FOLLOW UP IN

          THE NEXT FEW WEEKS. INSTRUCTIONS WERE GIVEN, QUESTIONS WERE

          ANSWERED, AND THE PATIENT EXPRESSED UNDERSTANDING AND AGREES WITH

          THE PLAN. I, MARIELLA MILLAN, DOCUMENTED THE ABOVE INFORMATION

          ACTING AS A SCRIBE FOR DR. RUSSELL. I HAVE REVIEWED THE ABOVE

          DOCUMENT, WRITTEN BY MARIELLA LEWIS AND I VERIFY THAT IT

          IS ACCURATE

           

PROCEDURE CODES

           

          [id number] INJ TRIGGER POINT  [facility]

           

DISPOSITION & COMMUNICATION

           

FOLLOW UP

           

          3 WEEKS

           

 

ELECTRONICALLY SIGNED BY RON RUSSELL MD ON

          2017 AT 12:24 PM EDT

           

           

           

 

DISCLAIMER :

THIS IS A VISIT SUMMARY EXTRACTED FROM THE [employer] CHART.

IT IS NOT A COPY OF THE [employer] PROGRESS NOTE.

DESMOND

## 2017-07-12 NOTE — REP
Local:  Contusion.

 

Technique:  AP, lateral, bilateral oblique views of the right foot.

 

Findings:

Transverse fracture of the fifth toe proximal phalanx with subtle angulation.

Overlying soft tissue swelling.  No subcutaneous emphysema or radiodense foreign

body.  Remainder examination demonstrates age-related degenerative changes

primarily involving the interphalangeal joints and tarsometatarsal joints.

 

Impression:

Fracture of the fifth toe proximal phalanx with subtle angulation and mild soft

tissue swelling

 

 

Signed by

Cachorro Mcmanus MD 07/12/2017 02:06 A

## 2017-07-21 NOTE — ECWPNPC
PATIENT NAME: MOR SANCHEZ

: 1965

GENDER: FEMALE

MRN: [id number]

VISIT DATE: 2017

DISCHARGE DATE: 17 1103

VISIT LOCKED DATE TIME: 

PHYSICIAN: BIGG THAPA 

PHYSICIAN PAGER NO: [phone]

RESOURCE: BIGG THAPA 

 

           

           

REASON FOR APPOINTMENT

           

          1. POST TPI

           

HISTORY OF PRESENT ILLNESS

           

      HISTORY OF PRESENT ILLNESS:

      PAIN

           

           

          THE PATIENT DESCRIBES THE PAIN...

           

      FALL RISK SCREENING:

      SCREENING

           

           

          :NO FALLS IN THE PAST YEAR

           

      TODAY'S VISIT:

      NOTES:

          RATES PAIN LEVEL TODAY AS 2/10. DESCRIBES PAIN AS IS S/P TPI WITH

          STEROIDS COMPLETED TO LEFT THORACIC AND LOW BACK AREA ON 6/15/17.

          HAD BRUNA 100 % PAIN RELIEF IN THE AREA AND THEN BEGAN HAVING

          MORE INTERMITTANT DISCOMFORT OVER LEFT HIP/SACRAN BONE AREA.

          FEELING LIKE THE LEFT IS ELEVATED. .

           

CURRENT MEDICATIONS

           

          TAKING MULTIVITAMINS OTC TABLET 1 TABLET ORALLY ONCE A DAY

          TAKING BIOTIN OTC TABLET 1 TABLET ORALLY ONCE A DAY

          TAKING FLONASE 50 MCG/ACT SUSPENSION 1 PUFF IN EACH NOSTRIL

          NASALLY ONCE A DAY AS NEEDED

          TAKING CLARITIN 10 MG TABLET 1 TABLET ORALLY ONCE A DAY

          TAKING LEVOTHYROXINE SODIUM 88 MCG TABLET 1 TABLET ORALLY ONCE A

          DAY

          TAKING BLACK COHOSH 40 MG CAPSULE ORALLY DAILY

          TAKING ZOFRAN ODT 4 MG TABLET DISPERSIBLE 1 TABLET ON THE TONGUE

          AND ALLOW TO DISSOLVE ORALLY EVERY 8 HRS PRN NAUSEA

          TAKING ASPIRIN CHILDRENS 81 MG TABLET CHEWABLE 1 TABLET ORALLY

          ONCE A DAY

          TAKING ACYCLOVIR 400 MG TABLET 1 TABLET ORALLY TWICE A DAY

          TAKING VALTREX 500MG TABLET 1 TAB PLANNED PARENTHOOD ORALLY BID

          TAKING OMEPRAZOLE 20MG 20MG TABLET 1 TAB BEFORE MEAL ORAL ONCE A

          DAY

          TAKING ACIDOPHILUS - TABLET 1 TAB ORALLY DAILY

          TAKING PENLAC 8% SOLUTION 1 DROP TO AFFECTED TOE NAILS DAILY,

          CLEAN OFF WITH ALCOHOL EVERY 7 DAYS EXTERNALLY ONCE A DAY

          TAKING ESTROVEN MENOPAUSE RELIEF - CAPSULE ORALLY

          TAKING PERCOCET 5-325 MG TABLET 1 TABLET ORALLY ONCE PER DAY

          MDD=1 FOR PAIN AS NEEDED

          NOT-TAKING OMEPRAZOLE 20 MG CAPSULE DELAYED RELEASE TAKE ONE

          CAPSULE BY MOUTH EVERY DAY BEFORE A MEAL

          MEDICATION LIST REVIEWED AND RECONCILED WITH THE PATIENT

           

PAST MEDICAL HISTORY

           

          HYPOTHYROIDISM

          ESOPHAGEAL REFLUX

          HS 2

          DEPRESSION/ANXIETY

          SUBSTANCE ABUSE

          BARTHOLIN'S GLAND CYST

          SKIN CA NOSE--? BCCA

          ECHO 5/15: NORMAL (EF75%, NO ABNL)

          HOLTER 7/15: NORMAL, WAS IN NSR DURING SYMPTOMS

          ABPM 8/15:MEAN SBP 130S

          +RF , MILD RA SX (SEE  NOTE)

          DERMOID CYST RT PELVIS CT 2016

          SJOGREN'S

           

ALLERGIES

           

          DEMEROL: NAUSEA/VOMITING: ALLERGY

          REGLAN: EKG ABNORMALITIES: ALLERGY

          PREMPRO: PALPITATIONS: SIDE EFFECTS

           

SOCIAL HISTORY

           

          GENERAL:

           

          PAIN CLINIC PFS, CLERGY, PUBLIC HEALTH REFERRALS

          PFS REFERRAL NEEDED?NO

          CLERGY REFERRAL NEEDED?NO

          PUBLIC HEALTH REFERRAL NEEDED?NO

          WAS THE PROVIDER NOTIFIED OF ANY PERTINENT INFO?NO

          HAS THE PATIENT BEEN EDUCATED REGARDING HIS/HER PLAN OF CARE?YES

          HAS THE PATIENT BEEN EDUCATED REGARDING PAIN, THE RISK FOR PAIN,

          THE IMPORTANCE OF EFFECTIVE PAIN MANAGEMENT, AND THE PAIN

          ASSESSMENT PROCESS?YES

           

           

          PATIENT: ____.

           

           

          REVIEWED, NO CHANGES.

           

REVIEW OF SYSTEMS

           

      REVIEWED BY:

           

          PROVIDER: BIGG HERNADEZ .

           

      CONSTITUTIONAL:

           

          ANY CHANGE IN YOUR MEDICAL CONDITION? NO . CHILLS NO . FEVER NO .

           

      INFECTION:

           

          DO YOU HAVE NEW INFECTIONS? NO . DO YOU HAVE HISTORY OF MRSA? NO

          .

           

      MUSCULOSKELETAL:

           

          ANY NEW PATTERNS OF PAIN OR NUMBNESS? NO .

           

      GASTROENTEROLOGY:

           

          ANY NEW CHANGE IN BOWEL CONTROL? NO .

           

      GENITOURINARY:

           

          ANY NEW CHANGE IN BLADDER CONTROL? NO . IS THERE A CHANCE YOU

          COULD BE PREGNANT? NO .

           

      HEMATOLOGY/LYMPH:

           

          DO YOU TAKE ANY BLOOD THINNERS? (FOR EXAMPLE- COUMADIN, PLAVIX,

          AGGRENOX, PLATEL, PRADAXA, OR XARELTO) NO . WHEN WAS YOUR LAST

          DOSE? DATE: TIME: .

           

      NEUROLOGY:

           

          HAVE YOU FALLEN IN THE PAST 6 MONTHS? NO . ANY NEW EXTREMITY

          NUMBNESS OR WEAKNESS? NO .

           

      CARDIOLOGY:

           

          DO YOU HAVE A PACEMAKER OR DEFIBRILLATOR? NO .

           

      RESPIRATORY:

           

          HAVE YOU BEEN SICK IN THE PAST WEEK? NO . FEVER NO . FLU LIKE

          SYMPTOMS? NO . COUGH NO .

           

      INTEGUMENTARY:

           

          DO YOU HAVE ANY RASHES OR OPEN SORES? NO .

           

      ALLERGIC/IMMUNO:

           

          ARE YOU ALLERGIC TO SHELLFISH OR IV DYE? NO . ANY NEW ALLERGIES?

          NO .

           

      PSYCHIATRIC:

           

          DO YOU HAVE THOUGHTS OF HURTING YOURSELF OR SOMEONE ELSE? NO .

          ARE YOU ABUSED, NEGLECTED, OR IN AN UNSAFE ENVIRONMENT? NO .

           

      ENDOCRINOLOGY:

           

          ARE YOU DIABETIC? NO .

           

      OTHER:

           

          DO YOU NEED ANY PRESCRIPTIONS? NO . IF YES, PLEASE LIST: ____ .

          ANY NEW PROBLEMS WITH YOUR MEDICATIONS? NO . WHEN DID YOU LAST

          EAT? ____ . WHEN DID YOU LAST DRINK? ____ . WHAT DID YOU LAST

          DRINK? ____ . NAME OF PERSON DRIVING YOU HOME? ____ . DO YOU HAVE

          ANY OTHER QUESTIONS OR CONCERNS NO .

           

VITAL SIGNS

           

          .4 LBS, HT 63 IN, BMI 19.55 INDEX, /70 MM HG, HR 74

          /MIN, RR 16 /MIN, TEMP 98.4 F, OXYGEN SAT % 100%, NA INITIALS TL

          1013, REVIEWED BY: MAKI.

           

EXAMINATION

           

      GENERAL EXAMINATION:

          PSYCHALERT , ORIENTED X 3 , APPROPRIATE MOOD AND AFFECT,

          FRUSTRATED .

           

          LUNGS:CLEAR TO AUSCULTATION BILATERALLY.

           

          HEART:HEART RATE REGULAR.

           

          MUSCULOSKELETAL:MILD TENDERNESS OVER LEFT LUMBAR FACETS AND

          SACRUM., FEW TRIGGER POINTS:, ELICITED WITH PALPATION OVER LUMBAR

          PARAVERTEBRAL MUSCLES AND INTO THE SACRUM. SLIGHT ELEVATION OF

          LEFT HIP WITH AMBULATION.

           

          JOINTS:RIGHT , KNEE , TENDER, WITH RANGE OF MOTION . NO WARMTH OR

          SWELLING NOTED TODAY.

           

ASSESSMENTS

           

          MYALGIA - M79.1 (PRIMARY)

           

          CHRONIC PRESCRIPTION OPIATE USE - Z79.891

           

TREATMENT

           

      MYALGIA

          NOTES: CONTINUE TENNIS BALL TO APPLY PRESSURE. CONSIDER POOL TO

          STRETCH LOW BACK. GENTLE TRACTION TO LEFT LEG.UTOX TODAY.

          CLINICAL NOTES: [employer] REGISTRY REVIEWED AND DEMNOSTRATES

          COMPLLIANCE. BRINGS IN MEDICATIONS WHICH IS APPROPRIATE FOR WHAT

          WAS DISPENSED.

           

PROCEDURE CODES

           

           ESTABILISHED PATIENT [facility] FACILITY CHARGE

           

DISPOSITION & COMMUNICATION

           

FOLLOW UP

           

          6-8 WEEKS (REASON: LOW BACK LINNEA)

           

 

ELECTRONICALLY SIGNED BY LUCA LYNN ON

          2017 AT 08:01 PM EDT

           

           

           

 

DISCLAIMER :

THIS IS A VISIT SUMMARY EXTRACTED FROM THE [employer] CHART.

IT IS NOT A COPY OF THE [employer] PROGRESS NOTE.

DESMOND

## 2017-08-24 NOTE — REPMRS
Patient History

The patient states she had a clinical breast exam in 8-2017

Patient is postmenopausal and is nulliparous.

Family history of endometrial cancer in maternal aunt and breast 

cancer in maternal grandmother.

Took hormonal contraceptives for 23 years.

 

Digital Woman Screen Mammo: August 24, 2017 - Exam #: 

[id number]

Bilateral CC and MLO view(s) were taken.

 

Technologist: Meera Ontiveros, Technologist

Prior study comparison: July 25, 2016, digital woman screen mammo

performed at [facility].  June 16, 2015, digital 

woman screen mammo performed at [facility].

 

FINDINGS: The breast tissue is heterogeneously dense.  This may 

lower the sensitivity of mammography.  There has been no change 

in the appearance of the mammogram from the prior studies.  There

is a moderate amount of residual fibroglandular tissue which is 

fairly symmetric. There is no interval development of dominant 

mass, areas of architectural distortion, or clustered 

microcalcification typical of malignancy.

 

ASSESSMENT: BI-RADS/ACR category 1 mammogram. Negative.

 

Recommendation

Routine screening mammogram in 1 year (for women over age 40).

This mammogram was interpreted with the aid of an FDA-approved 

computer-aided dectection system.

 

Electronically Signed By: Garrett Whiteside MD 08/24/17 [id number]

## 2017-09-01 NOTE — ECWPNPC
PATIENT NAME: MOR SANCHEZ

: 1965

GENDER: FEMALE

MRN: [id number]

VISIT DATE: 2017

DISCHARGE DATE: 17 1015

VISIT LOCKED DATE TIME: 

PHYSICIAN: BIGG THAPA 

PHYSICIAN PAGER NO: [phone]

RESOURCE: BIGG THAPA 

 

           

           

REASON FOR APPOINTMENT

           

          1. LOW BACK LINNEA

           

HISTORY OF PRESENT ILLNESS

           

      HISTORY OF PRESENT ILLNESS:

      PAIN

           

           

          THE PATIENT DESCRIBES THE PAIN...

           

      FALL RISK SCREENING:

      SCREENING

           

           

          :NO FALLS IN THE PAST YEAR

           

      TODAY'S VISIT:

      NOTES:

          RATES PAIN LEVEL TODAY AS 4/10. NOTES PAIN IS CENTERED OVER BOTH

          SHOULDERS AND OVER LEFT HIP AND BUTTUCK. INCREASED PAIN,

          STIFFNESS NOTED RIGHT KNEE.

           

CURRENT MEDICATIONS

           

          TAKING MULTIVITAMINS OTC TABLET 1 TABLET ORALLY ONCE A DAY

          TAKING BIOTIN OTC TABLET 1 TABLET ORALLY ONCE A DAY

          TAKING FLONASE 50 MCG/ACT SUSPENSION 1 PUFF IN EACH NOSTRIL

          NASALLY ONCE A DAY AS NEEDED

          TAKING CLARITIN 10 MG TABLET 1 TABLET ORALLY ONCE A DAY

          TAKING LEVOTHYROXINE SODIUM 88 MCG TABLET 1 TABLET ORALLY ONCE A

          DAY

          TAKING BLACK COHOSH 40 MG CAPSULE ORALLY DAILY

          TAKING ZOFRAN ODT 4 MG TABLET DISPERSIBLE 1 TABLET ON THE TONGUE

          AND ALLOW TO DISSOLVE ORALLY EVERY 8 HRS PRN NAUSEA

          TAKING ASPIRIN CHILDRENS 81 MG TABLET CHEWABLE 1 TABLET ORALLY

          ONCE A DAY

          TAKING VALTREX 500MG TABLET 1 TAB PLANNED PARENTHOOD ORALLY BID

          TAKING OMEPRAZOLE 20MG 20MG TABLET 1 TAB BEFORE MEAL ORAL ONCE A

          DAY

          TAKING ACIDOPHILUS - TABLET 1 TAB ORALLY DAILY

          TAKING PENLAC 8% SOLUTION 1 DROP TO AFFECTED TOE NAILS DAILY,

          CLEAN OFF WITH ALCOHOL EVERY 7 DAYS EXTERNALLY ONCE A DAY

          TAKING PERCOCET 5-325 MG TABLET 1 TABLET ORALLY ONCE PER DAY

          MDD=1 FOR PAIN AS NEEDED

          NOT-TAKING ACYCLOVIR 400 MG TABLET 1 TABLET ORALLY TWICE A DAY

          NOT-TAKING ESTROVEN MENOPAUSE RELIEF - CAPSULE ORALLY

          NOT-TAKING OMEPRAZOLE 20 MG CAPSULE DELAYED RELEASE TAKE ONE

          CAPSULE BY MOUTH EVERY DAY BEFORE A MEAL

          MEDICATION LIST REVIEWED AND RECONCILED WITH THE PATIENT

           

PAST MEDICAL HISTORY

           

          HYPOTHYROIDISM

          ESOPHAGEAL REFLUX

          HS 2

          DEPRESSION/ANXIETY

          SUBSTANCE ABUSE

          BARTHOLIN'S GLAND CYST

          SKIN CA NOSE--? BCCA

          ECHO 5/15: NORMAL (EF75%, NO ABNL)

          HOLTER 7/15: NORMAL, WAS IN NSR DURING SYMPTOMS

          ABPM 8/15:MEAN SBP 130S

          +RF , MILD RA SX (SEE  NOTE)

          DERMOID CYST RT PELVIS CT 2016

          SJOGREN'S

           

ALLERGIES

           

          DEMEROL: NAUSEA/VOMITING: ALLERGY

          REGLAN: EKG ABNORMALITIES: ALLERGY

          PREMPRO: PALPITATIONS: SIDE EFFECTS

           

SOCIAL HISTORY

           

          GENERAL:

           

          TOBACCO USE

          ARE YOU A:NONSMOKER

           

           

          Confucianism

          [id number] [employer]

           

           

          LEARNING BARRIERS / SPECIAL NEEDS

          CHANGE FROM LAST VISIT?NO

          BARRIERS TO LEARNING?NO

          HEARING IMPAIRED?NO

          VISION IMPAIRED?YES GLASSES FOR READING

          COGNITIVELY IMPAIRED?NO

          READINESS TO LEARN?YES

          LEARNING PREFERENCES?NO

          LEARNING CAPABILITIES PRESENT?YES

          EMOTIONAL BARRIERS?NO

          SPECIAL DEVICES?NO

           NEEDED?NO

           

           

          PAIN CLINIC PFS, CLERGY, PUBLIC HEALTH REFERRALS

          PFS REFERRAL NEEDED?NO

          CLERGY REFERRAL NEEDED?NO

          PUBLIC HEALTH REFERRAL NEEDED?NO

          WAS THE PROVIDER NOTIFIED OF ANY PERTINENT INFO?NO

          HAS THE PATIENT BEEN EDUCATED REGARDING HIS/HER PLAN OF CARE?YES

          HAS THE PATIENT BEEN EDUCATED REGARDING PAIN, THE RISK FOR PAIN,

          THE IMPORTANCE OF EFFECTIVE PAIN MANAGEMENT, AND THE PAIN

          ASSESSMENT PROCESS?YES

           

           

          PATIENT: ____.

           

           

          REVIEWED, NO CHANGES.

           

REVIEW OF SYSTEMS

           

      REVIEWED BY:

           

          PROVIDER: BIGG HERNADEZ .

           

      CONSTITUTIONAL:

           

          ANY CHANGE IN YOUR MEDICAL CONDITION? NO . CHILLS NO . FEVER NO .

           

      INFECTION:

           

          DO YOU HAVE NEW INFECTIONS? NO . DO YOU HAVE HISTORY OF MRSA? NO

          .

           

      MUSCULOSKELETAL:

           

          ANY NEW PATTERNS OF PAIN OR NUMBNESS? NO .

           

      GASTROENTEROLOGY:

           

          ANY NEW CHANGE IN BOWEL CONTROL? NO .

           

      GENITOURINARY:

           

          ANY NEW CHANGE IN BLADDER CONTROL? NO . IS THERE A CHANCE YOU

          COULD BE PREGNANT? NO .

           

      HEMATOLOGY/LYMPH:

           

          DO YOU TAKE ANY BLOOD THINNERS? (FOR EXAMPLE- COUMADIN, PLAVIX,

          AGGRENOX, PLATEL, PRADAXA, OR XARELTO) NO . WHEN WAS YOUR LAST

          DOSE? DATE: TIME: .

           

      NEUROLOGY:

           

          HAVE YOU FALLEN IN THE PAST 6 MONTHS? NO . ANY NEW EXTREMITY

          NUMBNESS OR WEAKNESS? NO .

           

      CARDIOLOGY:

           

          DO YOU HAVE A PACEMAKER OR DEFIBRILLATOR? NO .

           

      RESPIRATORY:

           

          HAVE YOU BEEN SICK IN THE PAST WEEK? NO . FEVER NO . FLU LIKE

          SYMPTOMS? NO . COUGH NO .

           

      INTEGUMENTARY:

           

          DO YOU HAVE ANY RASHES OR OPEN SORES? NO .

           

      ALLERGIC/IMMUNO:

           

          ARE YOU ALLERGIC TO SHELLFISH OR IV DYE? NO . ANY NEW ALLERGIES?

          NO .

           

      PSYCHIATRIC:

           

          DO YOU HAVE THOUGHTS OF HURTING YOURSELF OR SOMEONE ELSE? NO .

          ARE YOU ABUSED, NEGLECTED, OR IN AN UNSAFE ENVIRONMENT? NO .

           

      ENDOCRINOLOGY:

           

          ARE YOU DIABETIC? NO .

           

      OTHER:

           

          DO YOU NEED ANY PRESCRIPTIONS? YES . IF YES, PLEASE LIST:

          PERCOCET 5/325 MG . ANY NEW PROBLEMS WITH YOUR MEDICATIONS? NO .

          WHEN DID YOU LAST EAT? ____ . WHEN DID YOU LAST DRINK? ____ .

          WHAT DID YOU LAST DRINK? ____ . NAME OF PERSON DRIVING YOU HOME?

          ____ . DO YOU HAVE ANY OTHER QUESTIONS OR CONCERNS NO .

           

VITAL SIGNS

           

           LBS, HT 63 IN, BMI 19.66 INDEX, /88 MM HG, HR 84

          /MIN, RR 16 /MIN, TEMP 98.1 F, OXYGEN SAT % 97%, NA INITIALS SC

          09:20, REVIEWED BY: MAKI.

           

EXAMINATION

           

      GENERAL EXAMINATION:

          PSYCHALERT , ORIENTED X 3 , APPROPRIATE MOOD AND AFFECT.

           

          LUNGS:CLEAR TO AUSCULTATION BILATERALLY.

           

          HEART:HEART RATE REGULAR.

           

          MUSCULOSKELETAL:MILD TENDERNESS OVER LEFT LUMBAR FACETS AND

          SACRUM., FEW TRIGGER POINTS:, ELICITED WITH PALPATION OVER LUMBAR

          PARAVERTEBRAL MUSCLES AND INTO THE SACRUM. SLIGHT ELEVATION OF

          LEFT HIP WITH AMBULATION.

           

          JOINTS:RIGHT , KNEE , TENDER, WITH RANGE OF MOTION .SWELLING OVER

          MEDIAL ASPECT NOTED TODAY.

           

ASSESSMENTS

           

          MYALGIA - M79.1 (PRIMARY)

           

          CHRONIC PRESCRIPTION OPIATE USE - Z79.891

           

TREATMENT

           

      MYALGIA

          REFILL PERCOCET TABLET, 5-325 MG, 1 TABLET, ORALLY, ONCE PER DAY

          MDD=1 FOR PAIN AS NEEDED, 30 DAY(S), 20, REFILLS 0

          ARTHROCENTESIS INJECTION LARGE JOINT

          (KNEE-HIP-SHOULDER)BIGG THAPA 2017 9:52:44 AM > RIGHT

          NOTES: CONTINUE EXERCISES, BIKE.

          CLINICAL NOTES: ISTOP REGISTRY REVIEWED AND DEMNOSTRATES

          COMPLLIANCE. BRINGS IN MEDICATIONS WHICH IS APPROPRIATE FOR WHAT

          WAS DISPENSED.

           

PREVENTIVE MEDICINE

           

      PAIN CLINIC TEACHING:

           

          PROCEDURE TEACHING PRE-PROCEDURE TEACHING DONE AND PATIENT

          VERBALIZES UNDERSTANDING..

           

PROCEDURE CODES

           

           ESTABILISHED PATIENT [facility] CHARGE

           

DISPOSITION & COMMUNICATION

           

FOLLOW UP

           

          SCHEDULE INJECTION OUT 1 MONTH (REASON: CHECK AUTH FOR KNEE

          INJECTION)

           

 

ELECTRONICALLY SIGNED BY LUCA LYNN ON

          2017 AT 12:46 PM EDT

           

           

           

 

DISCLAIMER :

THIS IS A VISIT SUMMARY EXTRACTED FROM THE [employer] CHART.

IT IS NOT A COPY OF THE [employer] PROGRESS NOTE.

DESMOND

## 2017-09-16 NOTE — REP
Clinical:  Trauma.

 

Comparison:  06/13/2016

 

Technique:  AP, lateral, bilateral oblique views of the right ankle.

 

Findings:  Age-related changes are appreciated and stable.  No acute fracture

dislocation.  Joint spaces and ankle mortise are intact.

 

Impression:

Stable right ankle.  No acute fracture dislocation.

 

 

Signed by

Cachorro Mcmanus MD 09/16/2017 01:59 P

## 2017-09-21 NOTE — REP
Right knee series:  10 views.  Intraprocedural.

 

History:  Right knee injection for pain.

 

7 seconds of fluoroscopy time is reported.

 

Findings:  A sequence of 10 last image hold fluoroscopic spot radiographs of the

right knee document needle position and contrast injection associated with

injection procedure.

 

 

Signed by

Zach Worthy MD 09/21/2017 06:04 P

## 2017-09-22 NOTE — ECWPNPC
PATIENT NAME: MOR SANCHEZ

: 1965

GENDER: FEMALE

MRN: [id number]

VISIT DATE: 2017

DISCHARGE DATE: 17 1401

VISIT LOCKED DATE TIME: 

PHYSICIAN: RON RUSSELL  

PHYSICIAN PAGER NO: [phone]

RESOURCE: RON RUSSELL  

 

           

           

REASON FOR APPOINTMENT

           

          1. ARTHROCENTESIS INJ.

           

HISTORY OF PRESENT ILLNESS

           

      HISTORY OF PRESENT ILLNESS:

      PAIN

           

           

          THE PATIENT DESCRIBES THE PAIN...

           

      FALL RISK SCREENING:

      SCREENING

           

           

          :NO FALLS IN THE PAST YEAR

           

CURRENT MEDICATIONS

           

          TAKING MULTIVITAMINS OTC TABLET 1 TABLET ORALLY ONCE A DAY,

          NOTES: 1300

          TAKING BIOTIN OTC TABLET 1 TABLET ORALLY ONCE A DAY, NOTES: 345

          TAKING FLONASE 50 MCG/ACT SUSPENSION 1 PUFF IN EACH NOSTRIL

          NASALLY ONCE A DAY AS NEEDED, NOTES: 345

          TAKING CLARITIN 10 MG TABLET 1 TABLET ORALLY ONCE A DAY, NOTES:

          2000

          TAKING LEVOTHYROXINE SODIUM 88 MCG TABLET 1 TABLET ORALLY ONCE A

          DAY, NOTES: 345

          TAKING ZOFRAN ODT 4 MG TABLET DISPERSIBLE 1 TABLET ON THE TONGUE

          AND ALLOW TO DISSOLVE ORALLY EVERY 8 HRS PRN NAUSEA, NOTES: WEEK

          AGO

          TAKING ASPIRIN CHILDRENS 81 MG TABLET CHEWABLE 1 TABLET ORALLY

          ONCE A DAY, NOTES: 345

          TAKING VALTREX 500MG TABLET 1 TAB PLANNED PARENTHOOD ORALLY BID,

          NOTES: 345

          TAKING OMEPRAZOLE 20MG 20MG TABLET 1 TAB BEFORE MEAL ORAL ONCE A

          DAY, NOTES:  1300

          TAKING ACIDOPHILUS - TABLET 1 TAB ORALLY DAILY, NOTES: 345

          TAKING PENLAC 8% SOLUTION 1 DROP TO AFFECTED TOE NAILS DAILY,

          CLEAN OFF WITH ALCOHOL EVERY 7 DAYS EXTERNALLY ONCE A DAY, NOTES:

          WEEK AGO

          TAKING PERCOCET 5-325 MG TABLET 1 TABLET ORALLY ONCE PER DAY

          MDD=1 FOR PAIN AS NEEDED, NOTES:  1/ TAB 1930

          NOT-TAKING BLACK COHOSH 40 MG CAPSULE ORALLY DAILY

          NOT-TAKING ACYCLOVIR 400 MG TABLET 1 TABLET ORALLY TWICE A DAY

          NOT-TAKING ESTROVEN MENOPAUSE RELIEF - CAPSULE ORALLY

          NOT-TAKING OMEPRAZOLE 20 MG CAPSULE DELAYED RELEASE TAKE ONE

          CAPSULE BY MOUTH EVERY DAY BEFORE A MEAL

          MEDICATION LIST REVIEWED AND RECONCILED WITH THE PATIENT

           

PAST MEDICAL HISTORY

           

          HYPOTHYROIDISM

          ESOPHAGEAL REFLUX

          HS 2

          DEPRESSION/ANXIETY

          SUBSTANCE ABUSE

          BARTHOLIN'S GLAND CYST

          SKIN CA NOSE--? BCCA

          ECHO 5/15: NORMAL (EF75%, NO ABNL)

          HOLTER 7/15: NORMAL, WAS IN NSR DURING SYMPTOMS

          ABPM 8/15:MEAN SBP 130S

          +RF , MILD RA SX (SEE  NOTE)

          DERMOID CYST RT PELVIS CT 2016

          SJOGREN'S

           

ALLERGIES

           

          DEMEROL: NAUSEA/VOMITING: ALLERGY

          REGLAN: EKG ABNORMALITIES: ALLERGY

          PREMPRO: PALPITATIONS: SIDE EFFECTS

           

SOCIAL HISTORY

           

          GENERAL:

           

          TOBACCO USE

          ARE YOU A:NONSMOKER

           

           

          ALCOHOL SCREENING

          POINTS4

          INTERPRETATIONPOSITIVE

           

           

          RECREATIONAL DRUG USE

          DRUG USE?NO

           

           

          CAFFEINE

          CAFFEINE USE?NO

           

           

          Orthodoxy

          [id number] Baptism

           

           

          LANGUAGE

          LANGUAGES SPOKEN:ENGLISH

           

           

          LEARNING BARRIERS / SPECIAL NEEDS

          CHANGE FROM LAST VISIT?NO

          BARRIERS TO LEARNING?NO

          HEARING IMPAIRED?NO

          VISION IMPAIRED?YES GLASSES FOR READING

          COGNITIVELY IMPAIRED?NO

          READINESS TO LEARN?YES

          LEARNING PREFERENCES?NO

          LEARNING CAPABILITIES PRESENT?YES

          EMOTIONAL BARRIERS?NO

          SPECIAL DEVICES?NO

           NEEDED?NO

           

           

          PAIN CLINIC PFS, CLERGY, PUBLIC HEALTH REFERRALS

          PFS REFERRAL NEEDED?NO

          CLERGY REFERRAL NEEDED?NO

          PUBLIC HEALTH REFERRAL NEEDED?NO

          WAS THE PROVIDER NOTIFIED OF ANY PERTINENT INFO?NO

          HAS THE PATIENT BEEN EDUCATED REGARDING HIS/HER PLAN OF CARE?YES

          HAS THE PATIENT BEEN EDUCATED REGARDING PAIN, THE RISK FOR PAIN,

          THE IMPORTANCE OF EFFECTIVE PAIN MANAGEMENT, AND THE PAIN

          ASSESSMENT PROCESS?YES

           

           

          PATIENT: ____.

           

           

          ADVANCE DIRECTIVES

          HEALTH CARE PROXY?NO

          WOULD YOU LIKE MORE INFORMATION?YES GIVEN

          DO YOU HAVE A DNR?NO

          WOULD YOU LIKE MORE INFORMATION?NO

          LIVING WILL?NO

          WOULD YOU LIKE MORE INFORMATION?NO

          POWER OF ?NO

          WOULD YOU LIKE MORE INFORMATION?NO

           

           

          DOMESTIC VIOLENCE

          DO YOU FEEL SAFE IN YOUR ENVIRONMENT?YES

           

           

          REVIEWED, NO CHANGES.

           

REVIEW OF SYSTEMS

           

      REVIEWED BY:

           

          PROVIDER: _____ .

           

      CONSTITUTIONAL:

           

          ANY CHANGE IN YOUR MEDICAL CONDITION? NO . CHILLS NO . FEVER NO .

           

      INFECTION:

           

          DO YOU HAVE NEW INFECTIONS? NO . DO YOU HAVE HISTORY OF MRSA? NO

          .

           

      MUSCULOSKELETAL:

           

          ANY NEW PATTERNS OF PAIN OR NUMBNESS? NO .

           

      GASTROENTEROLOGY:

           

          ANY NEW CHANGE IN BOWEL CONTROL? NO .

           

      GENITOURINARY:

           

          ANY NEW CHANGE IN BLADDER CONTROL? NO . IS THERE A CHANCE YOU

          COULD BE PREGNANT? NO .

           

      HEMATOLOGY/LYMPH:

           

          DO YOU TAKE ANY BLOOD THINNERS? (FOR EXAMPLE- COUMADIN, PLAVIX,

          AGGRENOX, PLATEL, PRADAXA, OR XARELTO) NO . WHEN WAS YOUR LAST

          DOSE? DATE: TIME: .

           

      NEUROLOGY:

           

          HAVE YOU FALLEN IN THE PAST 6 MONTHS? NO . ANY NEW EXTREMITY

          NUMBNESS OR WEAKNESS? NO .

           

      CARDIOLOGY:

           

          DO YOU HAVE A PACEMAKER OR DEFIBRILLATOR? NO .

           

      RESPIRATORY:

           

          HAVE YOU BEEN SICK IN THE PAST WEEK? NO . FEVER NO . FLU LIKE

          SYMPTOMS? NO . COUGH NO .

           

      INTEGUMENTARY:

           

          DO YOU HAVE ANY RASHES OR OPEN SORES? NO .

           

      ALLERGIC/IMMUNO:

           

          ARE YOU ALLERGIC TO SHELLFISH OR IV DYE? NO . ANY NEW ALLERGIES?

          NO .

           

      PSYCHIATRIC:

           

          DO YOU HAVE THOUGHTS OF HURTING YOURSELF OR SOMEONE ELSE? NO .

          ARE YOU ABUSED, NEGLECTED, OR IN AN UNSAFE ENVIRONMENT? NO .

           

      ENDOCRINOLOGY:

           

          ARE YOU DIABETIC? NO .

           

      OTHER:

           

          DO YOU NEED ANY PRESCRIPTIONS? NO . IF YES, PLEASE LIST: ____ .

          ANY NEW PROBLEMS WITH YOUR MEDICATIONS? NO . WHEN DID YOU LAST

          EAT? 17 0800 . WHEN DID YOU LAST DRINK? 17 1230 . WHAT

          DID YOU LAST DRINK? WATER . NAME OF PERSON DRIVING YOU HOME? MAUDE

          . DO YOU HAVE ANY OTHER QUESTIONS OR CONCERNS NO .

           

VITAL SIGNS

           

           LBS, HT 63 IN, BMI 20.01 INDEX, /75 MM HG, HR 70

          /MIN, RR 16 /MIN, TEMP 97.4 F, OXYGEN SAT % 100%, NA INITIALS AW

          1148, REVIEWED BY: MIRIAM.

           

ASSESSMENTS

           

          PRIMARY OSTEOARTHRITIS OF RIGHT KNEE - M17.11 (PRIMARY)

           

TREATMENT

           

      OTHERS

          NOTES: PRE-PROCEDURE DIAGNOSIS: RIGHT KNEE

          OSTEOARTHRITISPOST-PROCEDURE DIAGNOSIS: RIGHT KNEE

          OSTEOARTHRITISPROCEDURE: INJECTION OF STEROIDS AT THE RIGHT KNEE

          WITH FLUOROSCOPIC GUIDANCESURGEON: MAUREEN ARTTHESIA:

          LOCALCOMPLICATIONS: NONEPRE-PROCEDURE NOTE: THE PATIENT IS

          SUFFERING OF SEVERE RIGHT KNEE PAIN. THE PATIENT HAS BEEN USING

          MULTIPLE MEDICATIONS TO CONTROL THIS PAIN. I HAVE DISCUSSED

          ALTERNATIVES WITH HIM. THE PATIENT INDICATES THAT HE CANNOT

          FUNCTION WITH THIS PAIN. AFTER DISCUSSING ALTERNATIVES HE WANTS

          TO MOVE FORWARD WITH A RIGHT KNEE INJECTION OF STEROIDS. I WENT

          THROUGH THE RISKS ALTERNATIVES AND BENEFITS ASSOCIATED WITH THIS

          PROCEDURE AND THE PATIENT EXPRESSED THAT HE WOULD LIKE TO

          PROCEED. DUE TO THE PATIENT BODY HABITUS AND FOR CERTAINTY THE

          PROCEDURE WAS DONE UNDER FLUOROSCOPIC GUIDANCE. PROCEDURE NOTE:

          THE PATIENT WAS TAKEN TO THE PROCEDURE ROOM AND PLACED IN THE

          SUPINE POSITION. THE RIGHT KNEE WERE CLEAN WITH BETHADINE

          SOLUTION AND DRAPED ASEPTICALLY. THE PROCEDURE WAS DONE WITH

          FLUOROSCOPIC GUIDANCE. ENTRY POINT WAS SELECTED AT THE SUPERIOR

          AND LATERAL MARGIN OF THE RIGHT KNEE. UNDER FLUOROSCOPIC GUIDANCE

          A 22 GAUGE SPINAL NEEDLE WAS ADVANCE SMOOTHLY UNTIL THE LATERAL

          THIRD OF THE KNEE JOINT WAS REACHED AS SEEN WITH AN AP VIEW.

          AFTER PROPER POSITION OF THE NEEDLE WAS REACHED ISOVUE DYE 30%

          .25CC WAS INJECTED SLOWLY SHOWING ADEQUATE SPREAD OF THE DYE OVER

          THE RIGHT KNEE JOINT. THEN A SOLUTION OF 4 CC OF BUPIVACAINE

          0.125% AND KENALOG 20 MG WAS INJECTED AT THE JOINT. INJECTION WAS

          DONE SMOOTHLY AND WITHOUT RESISTANCE. THERE WAS NO EVIDENCE OF

          PARESTHESIA OR BLOOD. THE PATIENT WAS SEND TO THE RECOVERY ROOM

          FOR OBSERVATION. FLUORO TIME WAS 7 SECONDS.POST-PROCEDURE NOTE: I

          DISCUSSED ALTERNATIVES WITH THE PATIENT. I WILL SEE HIM IN A F/UP

          IN THE NEXT FEW WEEKS. THERE WERE NO COMPLICATIONS DURING THE

          PROCEDURE. INSTRUCTIONS WERE GIVEN, QUESTION WERE ANSWERED AND

          THE PATIENT REPORTS UNDERSTANDING. I, MARIELLA MILLAN, DOCUMENTED

          THE ABOVE INFORMATION ACTING AS A SCRIBE FOR DR. RUSSELL. I HAVE

          REVIEWED THE ABOVE DOCUMENT, WRITTEN BY MARIELLA [id number] AND

          I VERIFY THAT IT IS ACCURATE.

           

DIAGNOSTIC IMAGING

           

          [facility] FLUORO GUIDANCE (PAIN)[id number]

           

PROCEDURE CODES

           

          57279 DRAIN/INJ JOINT/BURSA W/O US

           

          6045F RADXPS IN END [id number] PXD

           

          77533 NEEDLE LOCALIZATION BY XRAY

           

DISPOSITION & COMMUNICATION

           

FOLLOW UP

           

          3 WEEKS

           

 

ELECTRONICALLY SIGNED BY RON RUSSELL MD ON

          2017 AT 04:57 PM EDT

           

           

           

 

DISCLAIMER :

THIS IS A VISIT SUMMARY EXTRACTED FROM THE [employer] CHART.

IT IS NOT A COPY OF THE [employer] PROGRESS NOTE.

MTDD

## 2017-10-31 NOTE — ECWPNPC
PATIENT NAME: MOR SANCHEZ

: 1965

GENDER: FEMALE

MRN: [id number]

VISIT DATE: 10/30/2017

DISCHARGE DATE: 10/30/17 1303

VISIT LOCKED DATE TIME: 

PHYSICIAN: BIGG THAPA 

PHYSICIAN PAGER NO: [phone]

RESOURCE: BIGG THAPA 

 

           

           

REASON FOR APPOINTMENT

           

          1. POST PROCEDURE

           

HISTORY OF PRESENT ILLNESS

           

      HISTORY OF PRESENT ILLNESS:

      PAIN

           

           

          THE PATIENT DESCRIBES THE PAIN...

           

      FALL RISK SCREENING:

      SCREENING

           

           

          :NO FALLS IN THE PAST YEAR

           

      TODAY'S VISIT:

      NOTES:

          RATES PAIN TODAYAS 9/10. IS S/P RIGHT KNEE JOINT INJECTION ON

          17. NOTES 50% PAIN REDUCTION FOR ONE WEEK BU T ALSO HAD A

          FALL LANDING ON THE RIGHT KNEE. HAS BEEN HAVING INCREASED PAIN IN

          LOW BACK. THIS IS EFFECTING SLEEP. .

           

CURRENT MEDICATIONS

           

          TAKING MULTIVITAMINS OTC TABLET 1 TABLET ORALLY ONCE A DAY

          TAKING BIOTIN OTC TABLET 1 TABLET ORALLY ONCE A DAY

          TAKING FLONASE 50 MCG/ACT SUSPENSION 1 PUFF IN EACH NOSTRIL

          NASALLY ONCE A DAY AS NEEDED

          TAKING CLARITIN 10 MG TABLET 1 TABLET ORALLY ONCE A DAY

          TAKING LEVOTHYROXINE SODIUM 88 MCG TABLET 1 TABLET ORALLY ONCE A

          DAY

          TAKING ZOFRAN ODT 4 MG TABLET DISPERSIBLE 1 TABLET ON THE TONGUE

          AND ALLOW TO DISSOLVE ORALLY EVERY 8 HRS PRN NAUSEA

          TAKING VALTREX 500MG TABLET 1 TAB PLANNED PARENTHOOD ORALLY BID

          TAKING ACIDOPHILUS - TABLET 1 TAB ORALLY DAILY

          TAKING PENLAC 8% SOLUTION 1 DROP TO AFFECTED TOE NAILS DAILY,

          CLEAN OFF WITH ALCOHOL EVERY 7 DAYS EXTERNALLY ONCE A DAY

          TAKING PERCOCET 5-325 MG TABLET 1 TABLET ORALLY ONCE PER DAY

          MDD=1 FOR PAIN AS NEEDED

          TAKING OMEPRAZOLE 20MG 20MG TABLET 1 TAB BEFORE MEAL ORAL ONCE A

          DAY

          TAKING BLACK COHOSH 40 MG CAPSULE ORALLY DAILY

          NOT-TAKING ASPIRIN CHILDRENS 81 MG TABLET CHEWABLE 1 TABLET

          ORALLY ONCE A DAY

          NOT-TAKING ACYCLOVIR 400 MG TABLET 1 TABLET ORALLY TWICE A DAY

          DISCONTINUED ESTROVEN MENOPAUSE RELIEF - CAPSULE ORALLY

          DISCONTINUED OMEPRAZOLE 20 MG CAPSULE DELAYED RELEASE TAKE ONE

          CAPSULE BY MOUTH EVERY DAY BEFORE A MEAL

          MEDICATION LIST REVIEWED AND RECONCILED WITH THE PATIENT

           

PAST MEDICAL HISTORY

           

          HYPOTHYROIDISM

          ESOPHAGEAL REFLUX

          HS 2

          DEPRESSION/ANXIETY

          SUBSTANCE ABUSE

          BARTHOLIN'S GLAND CYST

          SKIN CA NOSE--? BCCA

          ECHO 5/15: NORMAL (EF75%, NO ABNL)

          HOLTER 7/15: NORMAL, WAS IN NSR DURING SYMPTOMS

          ABPM 8/15:MEAN SBP 130S

          +RF , MILD RA SX (SEE  NOTE)

          DERMOID CYST RT PELVIS CT 2016

          SJOGREN'S

           

ALLERGIES

           

          DEMEROL: NAUSEA/VOMITING: ALLERGY

          REGLAN: EKG ABNORMALITIES: ALLERGY

          PREMPRO: PALPITATIONS: SIDE EFFECTS

           

SOCIAL HISTORY

           

          GENERAL:

           

          TOBACCO USE

          ARE YOU A:NONSMOKER

           

           

          ALCOHOL SCREENING

          DID YOU HAVE A DRINK CONTAINING ALCOHOL IN THE PAST YEAR?YES

          HOW OFTEN DID YOU HAVE SIX OR MORE DRINKS ON ONE OCCASION IN THE

          PAST YEAR?LESS THAN MONTHLY (1 POINT)

          HOW MANY DRINKS DID YOU HAVE ON A TYPICAL DAY WHEN YOU WERE

          DRINKING IN THE PAST YEAR?1 OR 2 (0 POINTS)

          HOW OFTEN DID YOU HAVE A DRINK CONTAINING ALCOHOL IN THE PAST

          YEAR?TWO TO THREE TIMES PER WEEK (3 POINTS)

          POINTS4

          INTERPRETATIONPOSITIVE

           

           

          RECREATIONAL DRUG USE

          DRUG USE?NO

           

           

          CAFFEINE

          CAFFEINE USE?NO

           

           

          [facility]

          [id number]

           

           

          LANGUAGE

          LANGUAGES SPOKEN:ENGLISH

           

           

          LEARNING BARRIERS / SPECIAL NEEDS

          CHANGE FROM LAST VISIT?NO

          BARRIERS TO LEARNING?NO

          HEARING IMPAIRED?NO

          VISION IMPAIRED?YES GLASSES FOR READING

          COGNITIVELY IMPAIRED?NO

          READINESS TO LEARN?YES

          LEARNING PREFERENCES?NO

          LEARNING CAPABILITIES PRESENT?YES

          EMOTIONAL BARRIERS?NO

          SPECIAL DEVICES?NO

           NEEDED?NO

           

           

          PAIN CLINIC PFS, CLERGY, PUBLIC HEALTH REFERRALS

          PFS REFERRAL NEEDED?NO

          CLERGY REFERRAL NEEDED?NO

          PUBLIC HEALTH REFERRAL NEEDED?NO

          WAS THE PROVIDER NOTIFIED OF ANY PERTINENT INFO?NO

          HAS THE PATIENT BEEN EDUCATED REGARDING HIS/HER PLAN OF CARE?YES

          HAS THE PATIENT BEEN EDUCATED REGARDING PAIN, THE RISK FOR PAIN,

          THE IMPORTANCE OF EFFECTIVE PAIN MANAGEMENT, AND THE PAIN

          ASSESSMENT PROCESS?YES

           

           

          PATIENT: ____.

           

           

          ADVANCE DIRECTIVES

          HEALTH CARE PROXY?NO

          WOULD YOU LIKE MORE INFORMATION?YES GIVEN

          POWER OF ?NO

          DO YOU HAVE A DNR?NO

          WOULD YOU LIKE MORE INFORMATION?NO

          LIVING WILL?NO

          WOULD YOU LIKE MORE INFORMATION?NO

          WOULD YOU LIKE MORE INFORMATION?NO

           

           

          DOMESTIC VIOLENCE

          DO YOU FEEL SAFE IN YOUR ENVIRONMENT?YES

           

           

          REVIEWED, NO CHANGES.

           

REVIEW OF SYSTEMS

           

      REVIEWED BY:

           

          PROVIDER:    BIGG HERNADEZ .

           

      CONSTITUTIONAL:

           

          ANY CHANGE IN YOUR MEDICAL CONDITION?    NO . CHILLS    NO .

          FEVER    NO .

           

      INFECTION:

           

          DO YOU HAVE NEW INFECTIONS?    NO . DO YOU HAVE HISTORY OF MRSA? 

            NO .

           

      MUSCULOSKELETAL:

           

          ANY NEW PATTERNS OF PAIN OR NUMBNESS?    YES, PAIN ALL OVER HAS

          INCREASED AND DID NOT GET MUCH RELIEF FROM THE KNEE INJECTIONS. 

          .

           

      GASTROENTEROLOGY:

           

          ANY NEW CHANGE IN BOWEL CONTROL?    NO .

           

      GENITOURINARY:

           

          ANY NEW CHANGE IN BLADDER CONTROL?    NO . IS THERE A CHANCE YOU

          COULD BE PREGNANT?    NO .

           

      HEMATOLOGY/LYMPH:

           

          DO YOU TAKE ANY BLOOD THINNERS? (FOR EXAMPLE- COUMADIN, PLAVIX,

          AGGRENOX, PLATEL, PRADAXA, OR XARELTO)    NO . WHEN WAS YOUR LAST

          DOSE?    DATE: TIME:  .

           

      NEUROLOGY:

           

          HAVE YOU FALLEN IN THE PAST 6 MONTHS?    NO . ANY NEW EXTREMITY

          NUMBNESS OR WEAKNESS?    NO .

           

      CARDIOLOGY:

           

          DO YOU HAVE A PACEMAKER OR DEFIBRILLATOR?    NO .

           

      RESPIRATORY:

           

          HAVE YOU BEEN SICK IN THE PAST WEEK?    NO . FEVER    NO . FLU

          LIKE SYMPTOMS?    NO . COUGH    NO .

           

      INTEGUMENTARY:

           

          DO YOU HAVE ANY RASHES OR OPEN SORES?    NO .

           

      ALLERGIC/IMMUNO:

           

          ARE YOU ALLERGIC TO SHELLFISH OR IV DYE?    NO . ANY NEW

          ALLERGIES?    NO .

           

      PSYCHIATRIC:

           

          DO YOU HAVE THOUGHTS OF HURTING YOURSELF OR SOMEONE ELSE?    NO .

          ARE YOU ABUSED, NEGLECTED, OR IN AN UNSAFE ENVIRONMENT?    NO .

           

      ENDOCRINOLOGY:

           

          ARE YOU DIABETIC?    NO .

           

      OTHER:

           

          DO YOU NEED ANY PRESCRIPTIONS?    YES . IF YES, PLEASE LIST:   

          OXY/ACETAMINOPHEN 5/325MG . ANY NEW PROBLEMS WITH YOUR

          MEDICATIONS?    NO . WHEN DID YOU LAST EAT?    ____ . WHEN DID

          YOU LAST DRINK?    ____ . WHAT DID YOU LAST DRINK?    ____ . NAME

          OF PERSON DRIVING YOU HOME?    ____ . DO YOU HAVE ANY OTHER

          QUESTIONS OR CONCERNS    NO .

           

VITAL SIGNS

           

          .0 LBS, HT 63 IN, BMI 20.37 INDEX, /79 MM HG, HR 68

          /MIN, RR 16 /MIN, TEMP 98.0 F, OXYGEN SAT % 100%, NA INITIALS TL

          1152, REVIEWED BY: CM.

           

EXAMINATION

           

      GENERAL EXAMINATION:

          PSYCHALERT , ORIENTED X 3 , APPROPRIATE MOOD AND AFFECT.

           

          LUNGS:CLEAR TO AUSCULTATION BILATERALLY.

           

          HEART:HEART RATE REGULAR.

           

          MUSCULOSKELETAL:MILD TENDERNESS OVER LEFT LUMBAR FACETS AND

          SACRUM., FEW TRIGGER POINTS:, ELICITED WITH PALPATION OVER LUMBAR

          PARAVERTEBRAL MUSCLES AND INTO THE SACRUM. SLIGHT ELEVATION OF

          LEFT HIP WITH AMBULATION.

           

          JOINTS:RIGHT , KNEE , TENDER, WITH RANGE OF MOTION .SWELLING OVER

          MEDIAL ASPECT NOTED TODAY.

           

ASSESSMENTS

           

          MYALGIA - M79.1 (PRIMARY)

           

          CHRONIC PRESCRIPTION OPIATE USE - Z79.891

           

TREATMENT

           

      MYALGIA

          REFILL ZOFRAN ODT TABLET DISPERSIBLE, 4 MG, 1 TABLET ON THE

          TONGUE AND ALLOW TO DISSOLVE, ORALLY, EVERY 8 HRS PRN NAUSEA, 30

          DAY(S), 30, REFILLS 2

          REFILL PERCOCET TABLET, 5-325 MG, 1 TABLET, ORALLY, ONCE PER DAY

          MDD=1 FOR PAIN AS NEEDED, 30 DAY(S), 20, REFILLS 0

          TRIGGER POINT 3 + BIGG CAPPS 10/30/2017 12:47:19 PM >

          LOW BACK

          NOTES: TRIGGER POINT INJECTION MATERIAL WAS PRINTED.

           

PROCEDURE CODES

           

           ESTABILISHED PATIENT [facility] CHARGE

           

DISPOSITION & COMMUNICATION

           

FOLLOW UP

           

          AFTER INJECTION (REASON: CHECK AUTH FOR TPI LOW BACK)

           

 

ELECTRONICALLY SIGNED BY LUCA LYNN ON

          10/30/2017 AT 10:08 PM EDT

           

           

           

 

DISCLAIMER :

THIS IS A VISIT SUMMARY EXTRACTED FROM THE [employer] CHART.

IT IS NOT A COPY OF THE [employer] PROGRESS NOTE.

DESMOND

## 2017-11-12 NOTE — ECWPNPC
PATIENT NAME: MOR SANCHEZ

: 1965

GENDER: FEMALE

MRN: [id number]

VISIT DATE: 2017

DISCHARGE DATE: 17 1614

VISIT LOCKED DATE TIME: 

PHYSICIAN: RON RUSSELL  

PHYSICIAN PAGER NO: [phone]

RESOURCE: RON RUSSELL  

 

           

           

REASON FOR APPOINTMENT

           

          1. LOW BACK

           

HISTORY OF PRESENT ILLNESS

           

      HISTORY OF PRESENT ILLNESS:

      PAIN

           

           

          THE PATIENT DESCRIBES THE PAIN...

           

      FALL RISK SCREENING:

      SCREENING

           

           

          :NO FALLS IN THE PAST YEAR

           

CURRENT MEDICATIONS

           

          TAKING MULTIVITAMINS OTC TABLET 1 TABLET ORALLY ONCE A DAY,

          NOTES: 17 1200

          TAKING BIOTIN OTC TABLET 1 TABLET ORALLY ONCE A DAY, NOTES:

          17 040

          TAKING FLONASE 50 MCG/ACT SUSPENSION 1 PUFF IN EACH NOSTRIL

          NASALLY ONCE A DAY AS NEEDED, NOTES: 17

          TAKING CLARITIN 10 MG TABLET 1 TABLET ORALLY ONCE A DAY, NOTES:

          17 1900

          TAKING LEVOTHYROXINE SODIUM 88 MCG TABLET 1 TABLET ORALLY ONCE A

          DAY, NOTES: 17

          TAKING VALTREX 500MG TABLET 1 TAB PLANNED PARENTHOOD ORALLY BID,

          NOTES: 17

          TAKING ACIDOPHILUS - TABLET 1 TAB ORALLY DAILY, NOTES: 17

          TAKING PENLAC 8% SOLUTION 1 DROP TO AFFECTED TOE NAILS DAILY,

          CLEAN OFF WITH ALCOHOL EVERY 7 DAYS EXTERNALLY ONCE A DAY, NOTES:

          > 2 WEEKS

          TAKING OMEPRAZOLE 20MG 20MG TABLET 1 TAB BEFORE MEAL ORAL ONCE A

          DAY, NOTES: 17 1200

          TAKING BLACK COHOSH 40 MG CAPSULE ORALLY DAILY, NOTES: 17

          TAKING ZOFRAN ODT 4 MG TABLET DISPERSIBLE 1 TABLET ON THE TONGUE

          AND ALLOW TO DISSOLVE ORALLY EVERY 8 HRS PRN NAUSEA, NOTES: > 1

          WEEK

          TAKING PERCOCET 5-325 MG TABLET 1 TABLET ORALLY ONCE PER DAY

          MDD=1 FOR PAIN AS NEEDED, NOTES: 17 1300

          TAKING PREDNISONE 5 MG TABLET 1 TABLET ORALLY ONCE A DAY

          NOT-TAKING ASPIRIN CHILDRENS 81 MG TABLET CHEWABLE 1 TABLET

          ORALLY ONCE A DAY

          NOT-TAKING ACYCLOVIR 400 MG TABLET 1 TABLET ORALLY TWICE A DAY

          MEDICATION LIST REVIEWED AND RECONCILED WITH THE PATIENT

           

PAST MEDICAL HISTORY

           

          HYPOTHYROIDISM

          ESOPHAGEAL REFLUX

          HS 2

          DEPRESSION/ANXIETY

          SUBSTANCE ABUSE

          BARTHOLIN'S GLAND CYST

          SKIN CA NOSE--? BCCA

          ECHO 5/15: NORMAL (EF75%, NO ABNL)

          HOLTER 7/15: NORMAL, WAS IN NSR DURING SYMPTOMS

          ABPM 8/15:MEAN SBP 130S

          +RF , MILD RA SX (SEE  NOTE)

          DERMOID CYST RT PELVIS CT 

          SJOGREN'S

           

ALLERGIES

           

          DEMEROL: NAUSEA/VOMITING: ALLERGY

          REGLAN: EKG ABNORMALITIES: ALLERGY

          PREMPRO: PALPITATIONS: SIDE EFFECTS

           

REVIEW OF SYSTEMS

           

      REVIEWED BY:

           

          PROVIDER:    _____ .

           

      CONSTITUTIONAL:

           

          ANY CHANGE IN YOUR MEDICAL CONDITION?    YES PT STARTED ON

          PREDNISONE DAILY TO DIAGNOSE RHEUMATOID ARTHRITIS VS LUPUS VS

          FIBROMYALGIA . CHILLS    NO . FEVER    NO .

           

      INFECTION:

           

          DO YOU HAVE NEW INFECTIONS?    NO . DO YOU HAVE HISTORY OF MRSA? 

            NO .

           

      MUSCULOSKELETAL:

           

          ANY NEW PATTERNS OF PAIN OR NUMBNESS?    NO .

           

      GASTROENTEROLOGY:

           

          ANY NEW CHANGE IN BOWEL CONTROL?    NO .

           

      GENITOURINARY:

           

          ANY NEW CHANGE IN BLADDER CONTROL?    NO . IS THERE A CHANCE YOU

          COULD BE PREGNANT?    NO .

           

      HEMATOLOGY/LYMPH:

           

          DO YOU TAKE ANY BLOOD THINNERS? (FOR EXAMPLE- COUMADIN, PLAVIX,

          AGGRENOX, PLATEL, PRADAXA, OR XARELTO)    NO . WHEN WAS YOUR LAST

          DOSE?    DATE: TIME:  .

           

      NEUROLOGY:

           

          HAVE YOU FALLEN IN THE PAST 6 MONTHS?    YES ABOUT TWO MONTHS

          AGO, GOT DIZZY AND FELL. HAS DISCUSSED WITH HER PHYSICIAN. . ANY

          NEW EXTREMITY NUMBNESS OR WEAKNESS?    NO .

           

      CARDIOLOGY:

           

          DO YOU HAVE A PACEMAKER OR DEFIBRILLATOR?    NO .

           

      RESPIRATORY:

           

          HAVE YOU BEEN SICK IN THE PAST WEEK?    NO . FEVER    NO . FLU

          LIKE SYMPTOMS?    NO . COUGH    NO .

           

      INTEGUMENTARY:

           

          DO YOU HAVE ANY RASHES OR OPEN SORES?    NO .

           

      ALLERGIC/IMMUNO:

           

          ARE YOU ALLERGIC TO SHELLFISH OR IV DYE?    NO . ANY NEW

          ALLERGIES?    NO .

           

      PSYCHIATRIC:

           

          DO YOU HAVE THOUGHTS OF HURTING YOURSELF OR SOMEONE ELSE?    NO .

          ARE YOU ABUSED, NEGLECTED, OR IN AN UNSAFE ENVIRONMENT?    NO .

           

      ENDOCRINOLOGY:

           

          ARE YOU DIABETIC?    NO .

           

      OTHER:

           

          DO YOU NEED ANY PRESCRIPTIONS?    NO . IF YES, PLEASE LIST:   

          ____ . ANY NEW PROBLEMS WITH YOUR MEDICATIONS?    NO . WHEN DID

          YOU LAST EAT?    ____17 0630 . WHEN DID YOU LAST DRINK?   

          ____17 1415 . WHAT DID YOU LAST DRINK?    ____WATER . NAME

          OF PERSON DRIVING YOU HOME?    ____YELLOW CAB . DO YOU HAVE ANY

          OTHER QUESTIONS OR CONCERNS    NO .

           

VITAL SIGNS

           

          .0 LBS, HT 63 IN, BMI 20.37 INDEX, /68 MM HG, HR 78

          /MIN, RR 16 /MIN, TEMP 98.6 F, OXYGEN SAT % 96%, SAFE IN ENV?

          (Y/N) YES, NA INITIALS TL 1438, REVIEWED BY: JAVED

           

ASSESSMENTS

           

          MYALGIA - M79.1 (PRIMARY)

           

PROCEDURES

           

      PN TRIGGER POINT INJECTION NO STEROIDS

          PRE PROCEDURE DIAGNOSIS    1. MYALGIA 2. PAIN AT LEFT LOWER BACK

          AREA

          POST PROCEDURE DIAGNOSIS    1. MYALGIA 2. PAIN AT LEFT LOWER BACK

          AREA

          PROCEDURE    TRIGGER POINT INJECTION AT LEFT LOWER BACK AREA

          SURGEON    DR. ORN RUSSELL

          ASSISTANT    NONE

          ANESTHESIA    LOCAL

          PRE PROCEDURE NOTE    52 YEAR-OLD PATIENT WITH HISTORY OF CHRONIC

          PAIN AT LEFT LOWER BACK AREA. I EVALUATED THE PATIENT AND

          REVIEWED THE CHART. THERE IS EVIDENCE OF BANDS OF TISSUE WITH

          RESTRICTION OF MOVEMENT AND PRESENCE OF TRIGGER POINT AT THE

          AFFECTED AREA. I WENT OVER THE RISKS, ALTERNATIVES, AND BENEFITS

          ASSOCIATED WITH THIS PROCEDURE. THE PATIENT WOULD LIKE TO PROCEED

          AND GAVE CONSENT TO PERFORM THE PROCEDURE. THE PATIENT DENIES

          UNEXPLAINABLE WEIGHT LOSS, FEVER, CHILLS, OR NEW CHANGES IN

          URINARY OR BOWEL CONTROL.

          DESCRIPTION OF PROCEDURE    THE PATIENT WAS BROUGHT TO THE

          PROCEDURE ROOM AND PLACED IN THE SITTING PRONE POSITION. THE AREA

          WAS CLEANED WITH ALCOHOL. THE PROCEDURE WAS DONE USING ASEPTIC

          STERILE TECHNIQUES. I CHECKED LATERALITY AND THE LEVEL WHERE THE

          PROCEDURE WAS GOING TO BE PERFORMED WITH THE PATIENT AND THE

          SUPPORTING STAFF AT THE MOMENT OF THE TIME OUT IN THE PROCEDURE

          ROOM. USING A 25-GAUGE NEEDLE, TRIGGER POINTS WERE INJECTED AT

          THE LEFT LOWER BACK AREA WITH A TOTAL OF 40 ML OF BUPIVACAINE

          0.25%. AS AGREED WITH THE PATIENT THE PROCEDURE WAS DONE WITHOUT

          STEROIDS. THERE WAS NO EVIDENCE OF BLOOD, PARESTHESIA OR

          CEREBROSPINAL FLUID DURING THE PROCEDURE. THE PATIENT WAS SENT TO

          THE RECOVERY ROOM. THE PATIENT WAS MOVING THE EXTREMITIES AND

          DOING WELL. THERE WAS NO COMPLICATION DURING THE PROCEDURE.

          POST PROCEDURE NOTE    THE PATIENT WILL BE SEEN IN A FOLLOW UP IN

          THE NEXT FEW WEEKS. INSTRUCTIONS WERE GIVEN, QUESTIONS WERE

          ANSWERED, AND THE PATIENT EXPRESSED UNDERSTANDING AND AGREED WITH

          THE PLAN. I, MARIELLA MILLAN, DOCUMENTED THE ABOVE INFORMATION

          ACTING AS A SCRIBE FOR DR. RUSSELL. I HAVE REVIEWED THE ABOVE

          DOCUMENT, WRITTEN BY MARIELLA SHAMPINE SCRIBE AND I VERIFY THAT IT

          IS ACCURATE

           

PROCEDURE CODES

           

          65922 INJ TRIGGER POINT / MUSCL

           

DISPOSITION & COMMUNICATION

           

FOLLOW UP

           

          3 WEEKS

           

 

ELECTRONICALLY SIGNED BY RNO RUSSELL MD ON

          2017 AT 08:42 PM EST

           

           

           

 

DISCLAIMER :

THIS IS A VISIT SUMMARY EXTRACTED FROM THE [employer] CHART.

IT IS NOT A COPY OF THE [employer] PROGRESS NOTE.

DESMOND

## 2017-12-04 NOTE — ECWPNPC
PATIENT NAME: MOR SANCHEZ

: 1965

GENDER: FEMALE

MRN: [id number]

VISIT DATE: 2017

DISCHARGE DATE: 17 1438

VISIT LOCKED DATE TIME: 

PHYSICIAN: BIGG THAPA 

PHYSICIAN PAGER NO: [phone]

RESOURCE: BIGG THAPA 

 

           

           

REASON FOR APPOINTMENT

           

          1. POST TPI

           

HISTORY OF PRESENT ILLNESS

           

      HISTORY OF PRESENT ILLNESS:

      PAIN

           

           

          THE PATIENT DESCRIBES THE PAIN...

           

      FALL RISK SCREENING:

      SCREENING

           

           

          :NO FALLS IN THE PAST YEAR

           

      TODAY'S VISIT:

      NOTES:

          S/P TRIGGER POINTS TO LOW BACK COMPLETED ON 17 TO LOW BACK

          AREA. PAIN LEVEL PRIOR WAS /10 AND POST INJECTION WAS /10.

          TODAY IS NOTING INTENSE PAIN IN VARIOUS JOINTS INCLUDING RIGHT

          ELBOW, WRIST AND KNEE. WAS SEEN BY RHEUMATOLOGIST - HE HAS

          STARTED HER ON PREDNISONE..

           

CURRENT MEDICATIONS

           

          TAKING MULTIVITAMINS OTC TABLET 1 TABLET ORALLY ONCE A DAY

          TAKING BIOTIN OTC TABLET 1 TABLET ORALLY ONCE A DAY

          TAKING FLONASE 50 MCG/ACT SUSPENSION 1 PUFF IN EACH NOSTRIL

          NASALLY ONCE A DAY AS NEEDED

          TAKING CLARITIN 10 MG TABLET 1 TABLET ORALLY ONCE A DAY

          TAKING LEVOTHYROXINE SODIUM 88 MCG TABLET 1 TABLET ORALLY ONCE A

          DAY

          TAKING VALTREX 500MG TABLET 1 TAB PLANNED PARENTHOOD ORALLY BID

          TAKING ACIDOPHILUS - TABLET 1 TAB ORALLY DAILY

          TAKING PENLAC 8% SOLUTION 1 DROP TO AFFECTED TOE NAILS DAILY,

          CLEAN OFF WITH ALCOHOL EVERY 7 DAYS EXTERNALLY ONCE A DAY

          TAKING OMEPRAZOLE 20MG 20MG TABLET 1 TAB BEFORE MEAL ORAL ONCE A

          DAY

          TAKING BLACK COHOSH 40 MG CAPSULE ORALLY DAILY

          TAKING ZOFRAN ODT 4 MG TABLET DISPERSIBLE 1 TABLET ON THE TONGUE

          AND ALLOW TO DISSOLVE ORALLY EVERY 8 HRS PRN NAUSEA

          TAKING PERCOCET 5-325 MG TABLET 1 TABLET ORALLY ONCE PER DAY

          MDD=1 FOR PAIN AS NEEDED

          TAKING PREDNISONE 5 MG TABLET 1 TABLET ORALLY ONCE A DAY

          NOT-TAKING ASPIRIN CHILDRENS 81 MG TABLET CHEWABLE 1 TABLET

          ORALLY ONCE A DAY

          NOT-TAKING ACYCLOVIR 400 MG TABLET 1 TABLET ORALLY TWICE A DAY

          MEDICATION LIST REVIEWED AND RECONCILED WITH THE PATIENT

           

PAST MEDICAL HISTORY

           

          HYPOTHYROIDISM

          ESOPHAGEAL REFLUX

          HS 2

          DEPRESSION/ANXIETY

          SUBSTANCE ABUSE

          BARTHOLIN'S GLAND CYST

          SKIN CA NOSE--? BCCA

          ECHO 5/15: NORMAL (EF75%, NO ABNL)

          HOLTER 7/15: NORMAL, WAS IN NSR DURING SYMPTOMS

          ABPM 8/15:MEAN SBP 130S

          +RF 4/16, MILD RA SX (SEE  NOTE)

          DERMOID CYST RT PELVIS CT 2016

          SJOGREN'S

           

ALLERGIES

           

          DEMEROL: NAUSEA/VOMITING: ALLERGY

          REGLAN: EKG ABNORMALITIES: ALLERGY

          PREMPRO: PALPITATIONS: SIDE EFFECTS

           

SOCIAL HISTORY

           

          GENERAL:

           

          TOBACCO USE

          ARE YOU A:NONSMOKER

           

           

          ALCOHOL SCREENING

          DID YOU HAVE A DRINK CONTAINING ALCOHOL IN THE PAST YEAR?YES

          HOW OFTEN DID YOU HAVE SIX OR MORE DRINKS ON ONE OCCASION IN THE

          PAST YEAR?LESS THAN MONTHLY (1 POINT)

          HOW MANY DRINKS DID YOU HAVE ON A TYPICAL DAY WHEN YOU WERE

          DRINKING IN THE PAST YEAR?1 OR 2 (0 POINTS)

          HOW OFTEN DID YOU HAVE A DRINK CONTAINING ALCOHOL IN THE PAST

          YEAR?TWO TO THREE TIMES PER WEEK (3 POINTS)

          POINTS4

          INTERPRETATIONPOSITIVE

           

           

          RECREATIONAL DRUG USE

          DRUG USE?NO

           

           

          CAFFEINE

          CAFFEINE USE?NO

           

           

          [employer]

          [id number] Catholic

           

           

          LANGUAGE

          LANGUAGES SPOKEN:ENGLISH

           

           

          LEARNING BARRIERS / SPECIAL NEEDS

          CHANGE FROM LAST VISIT?NO

          BARRIERS TO LEARNING?NO

          HEARING IMPAIRED?NO

          VISION IMPAIRED?YES GLASSES FOR READING

          COGNITIVELY IMPAIRED?NO

          READINESS TO LEARN?YES

          LEARNING PREFERENCES?NO

          LEARNING CAPABILITIES PRESENT?YES

          EMOTIONAL BARRIERS?NO

          SPECIAL DEVICES?NO

           NEEDED?NO

           

           

          PAIN CLINIC PFS, CLERGY, PUBLIC HEALTH REFERRALS

          PFS REFERRAL NEEDED?NO

          CLERGY REFERRAL NEEDED?NO

          PUBLIC HEALTH REFERRAL NEEDED?NO

          WAS THE PROVIDER NOTIFIED OF ANY PERTINENT INFO?NO

          HAS THE PATIENT BEEN EDUCATED REGARDING HIS/HER PLAN OF CARE?YES

          HAS THE PATIENT BEEN EDUCATED REGARDING PAIN, THE RISK FOR PAIN,

          THE IMPORTANCE OF EFFECTIVE PAIN MANAGEMENT, AND THE PAIN

          ASSESSMENT PROCESS?YES

           

           

          PATIENT: ____.

           

           

          ADVANCE DIRECTIVES

          HEALTH CARE PROXY?NO

          WOULD YOU LIKE MORE INFORMATION?YES GIVEN

          DO YOU HAVE A DNR?NO

          WOULD YOU LIKE MORE INFORMATION?NO

          LIVING WILL?NO

          WOULD YOU LIKE MORE INFORMATION?NO

          POWER OF ?NO

          WOULD YOU LIKE MORE INFORMATION?NO

           

           

          DOMESTIC VIOLENCE

          DO YOU FEEL SAFE IN YOUR ENVIRONMENT?YES

           

           

          REVIEWED, NO CHANGES.

           

REVIEW OF SYSTEMS

           

      REVIEWED BY:

           

          PROVIDER:    _____ .

           

      CONSTITUTIONAL:

           

          ANY CHANGE IN YOUR MEDICAL CONDITION?    NO . CHILLS    NO .

          FEVER    NO .

           

      INFECTION:

           

          DO YOU HAVE NEW INFECTIONS?    NO . DO YOU HAVE HISTORY OF MRSA? 

            NO .

           

      MUSCULOSKELETAL:

           

          ANY NEW PATTERNS OF PAIN OR NUMBNESS?    YES, PAIN IN RIGHT ELBOW

          AND DOWN TO WRIST .

           

      GASTROENTEROLOGY:

           

          ANY NEW CHANGE IN BOWEL CONTROL?    NO .

           

      GENITOURINARY:

           

          ANY NEW CHANGE IN BLADDER CONTROL?    NO . IS THERE A CHANCE YOU

          COULD BE PREGNANT?    NO .

           

      HEMATOLOGY/LYMPH:

           

          DO YOU TAKE ANY BLOOD THINNERS? (FOR EXAMPLE- COUMADIN, PLAVIX,

          AGGRENOX, PLATEL, PRADAXA, OR XARELTO)    NO . WHEN WAS YOUR LAST

          DOSE?    DATE: TIME:  .

           

      NEUROLOGY:

           

          HAVE YOU FALLEN IN THE PAST 6 MONTHS?    NO . ANY NEW EXTREMITY

          NUMBNESS OR WEAKNESS?    NO .

           

      CARDIOLOGY:

           

          DO YOU HAVE A PACEMAKER OR DEFIBRILLATOR?    NO .

           

      RESPIRATORY:

           

          HAVE YOU BEEN SICK IN THE PAST WEEK?    NO . FEVER    NO . FLU

          LIKE SYMPTOMS?    NO . COUGH    NO .

           

      INTEGUMENTARY:

           

          DO YOU HAVE ANY RASHES OR OPEN SORES?    NO .

           

      ALLERGIC/IMMUNO:

           

          ARE YOU ALLERGIC TO SHELLFISH OR IV DYE?    NO . ANY NEW

          ALLERGIES?    NO .

           

      PSYCHIATRIC:

           

          DO YOU HAVE THOUGHTS OF HURTING YOURSELF OR SOMEONE ELSE?    NO .

          ARE YOU ABUSED, NEGLECTED, OR IN AN UNSAFE ENVIRONMENT?    NO .

           

      ENDOCRINOLOGY:

           

          ARE YOU DIABETIC?    NO .

           

      OTHER:

           

          DO YOU NEED ANY PRESCRIPTIONS?    NO . IF YES, PLEASE LIST:   

          ____ . ANY NEW PROBLEMS WITH YOUR MEDICATIONS?    NO . WHEN DID

          YOU LAST EAT?    ____ . WHEN DID YOU LAST DRINK?    ____ . WHAT

          DID YOU LAST DRINK?    ____ . NAME OF PERSON DRIVING YOU HOME?   

          ____ . DO YOU HAVE ANY OTHER QUESTIONS OR CONCERNS    YES, ELBOW,

          WRIST AND KNEE ARE PAINFUL. BACK IS PRETTY GOOD. .

           

VITAL SIGNS

           

          .2 LBS, HT 63 IN, BMI 20.76 INDEX, /76 MM HG, HR 74

          /MIN, RR 18 /MIN, TEMP 97.8 F, OXYGEN SAT % 98%, SAFE IN ENV?

          (Y/N) YES, NA INITIALS SC 13:29, REVIEWED BY: MAKI.

           

EXAMINATION

           

      GENERAL EXAMINATION:

          PSYCHALERT , ORIENTED X 3 , APPROPRIATE MOOD AND AFFECT , VERY

          TALKATIVE.

           

          LUNGS:BILATERAL RHONCHI, CLEARED WITH COUGH .

           

          MUSCULOSKELETAL:MUSCLE STRENGTH TESTING 5/5 BILATERAL UPPER AND

          LOWER EXTREMITES, TRIGGER POINTS AND TIGHT FIBROUS BANDS OVER

          TRAPEZIUS AND LUMBAR PARAVERTEBRAL MUSCLES. EZXQUISITE TENDERNESS

          OVER RIGHT FIRST EXTENSOR TENDON. .

           

ASSESSMENTS

           

          MYALGIA - M79.1 (PRIMARY)

           

          CHRONIC PRESCRIPTION OPIATE USE - Z79.891

           

          TENDONITIS OF ELBOW, RIGHT - M77.8

           

TREATMENT

           

      MYALGIA

          REFILL PREDNISONE TABLET, 5 MG, 1 TABLET, ORALLY, ONCE A DAY, 30

          DAY(S), 30 TABLET, REFILLS 0

          TRIGGER POINT 3 + BIGG CAPPS 2017 2:25:17 PM >

          NECK/SHOULDERS/UPPER BACK

          NOTES: ACE WRAP OR ARM SPLINT TO RIGHT FOREARM.KEEP FOLLOWUP WITH

          RHEUMATOLOGISTICE TO RIGHT ELBOW.

           

PREVENTIVE MEDICINE

           

      PAIN CLINIC TEACHING:

           

          PROCEDURE TEACHING   PRE-PROCEDURE TEACHING DONE AND PATIENT

          VERBALIZES UNDERSTANDING..

           

PROCEDURE CODES

           

           ESTABILISHED PATIENT [facility] CHARGE

           

DISPOSITION & COMMUNICATION

           

FOLLOW UP

           

          REASON: BACK PAIN

           

 

ELECTRONICALLY SIGNED BY LUCA LYNN ON

          2017 AT 12:34 PM EST

           

           

           

 

DISCLAIMER :

THIS IS A VISIT SUMMARY EXTRACTED FROM THE [employer] CHART.

IT IS NOT A COPY OF THE [employer] PROGRESS NOTE.

DESMOND

## 2017-12-25 NOTE — ECWPNPC
PATIENT NAME: MOR SANCHEZ

: 1965

GENDER: FEMALE

MRN: [id number]

VISIT DATE: 2017

DISCHARGE DATE: 17 1445

VISIT LOCKED DATE TIME: 

PHYSICIAN: RON RUSSELL  

PHYSICIAN PAGER NO: [phone]

RESOURCE: RON RUSSELL  

 

           

           

REASON FOR APPOINTMENT

           

          1. TPI

           

HISTORY OF PRESENT ILLNESS

           

      HISTORY OF PRESENT ILLNESS:

      PAIN

           

           

          THE PATIENT DESCRIBES THE PAIN...

           

      FALL RISK SCREENING:

      SCREENING

           

           

          :NO FALLS IN THE PAST YEAR

           

CURRENT MEDICATIONS

           

          TAKING MULTIVITAMINS OTC TABLET 1 TABLET ORALLY ONCE A DAY,

          NOTES: 5 DAYS AGO

          TAKING BIOTIN OTC TABLET 1 TABLET ORALLY ONCE A DAY, NOTES: 3

          DAYS AGO

          TAKING FLONASE 50 MCG/ACT SUSPENSION 1 PUFF IN EACH NOSTRIL

          NASALLY ONCE A DAY AS NEEDED, NOTES: 0

          TAKING CLARITIN 10 MG TABLET 1 TABLET ORALLY ONCE A DAY, NOTES:

          17@1830

          TAKING LEVOTHYROXINE SODIUM 88 MCG TABLET 1 TABLET ORALLY ONCE A

          DAY, NOTES: 034

          TAKING VALTREX 500MG TABLET 1 TAB PLANNED PARENTHOOD ORALLY BID,

          NOTES: 0340

          TAKING ACIDOPHILUS - TABLET 1 TAB ORALLY DAILY, NOTES: 0340

          TAKING PENLAC 8% SOLUTION 1 DROP TO AFFECTED TOE NAILS DAILY,

          CLEAN OFF WITH ALCOHOL EVERY 7 DAYS EXTERNALLY ONCE A DAY, NOTES:

          2 MONTHS AGO

          TAKING OMEPRAZOLE 20MG 20MG TABLET 1 TAB BEFORE MEAL ORAL ONCE A

          DAY, NOTES: 3 DAYS AGO

          TAKING BLACK COHOSH 40 MG CAPSULE ORALLY DAILY, NOTES: 1 WEEK AGO

          TAKING ZOFRAN ODT 4 MG TABLET DISPERSIBLE 1 TABLET ON THE TONGUE

          AND ALLOW TO DISSOLVE ORALLY EVERY 8 HRS PRN NAUSEA, NOTES:

          17@1400

          TAKING PERCOCET 5-325 MG TABLET 1 TABLET ORALLY ONCE PER DAY

          MDD=1 FOR PAIN AS NEEDED, NOTES: 17@1900

          TAKING NAPROXEN 500 MG TABLET 1 TAB ORALLY EVERY 4 HOURS AS

          NEEDED

          DISCONTINUED PREDNISONE 5 MG TABLET 1 TABLET ORALLY ONCE A DAY

          DISCONTINUED ASPIRIN CHILDRENS 81 MG TABLET CHEWABLE 1 TABLET

          ORALLY ONCE A DAY

          UNKNOWN ACYCLOVIR 400 MG TABLET 1 TABLET ORALLY TWICE A DAY

          MEDICATION LIST REVIEWED AND RECONCILED WITH THE PATIENT

           

PAST MEDICAL HISTORY

           

          HYPOTHYROIDISM

          ESOPHAGEAL REFLUX

          HS 2

          DEPRESSION/ANXIETY

          SUBSTANCE ABUSE

          BARTHOLIN'S GLAND CYST

          SKIN CA NOSE--? BCCA

          ECHO 5/15: NORMAL (EF75%, NO ABNL)

          HOLTER 7/15: NORMAL, WAS IN NSR DURING SYMPTOMS

          ABPM 8/15:MEAN SBP 130S

          +RF , MILD RA SX (SEE  NOTE)

          DERMOID CYST RT PELVIS CT 2016

          SJOGREN'S

           

ALLERGIES

           

          DEMEROL: NAUSEA/VOMITING: ALLERGY

          REGLAN: EKG ABNORMALITIES: ALLERGY

          PREMPRO: PALPITATIONS: SIDE EFFECTS

           

SOCIAL HISTORY

           

          GENERAL:

           

          TOBACCO USE

          ARE YOU A:NONSMOKER

           

           

          ALCOHOL SCREENING

          DID YOU HAVE A DRINK CONTAINING ALCOHOL IN THE PAST YEAR?YES

          HOW OFTEN DID YOU HAVE SIX OR MORE DRINKS ON ONE OCCASION IN THE

          PAST YEAR?LESS THAN MONTHLY (1 POINT)

          HOW MANY DRINKS DID YOU HAVE ON A TYPICAL DAY WHEN YOU WERE

          DRINKING IN THE PAST YEAR?1 OR 2 (0 POINTS)

          HOW OFTEN DID YOU HAVE A DRINK CONTAINING ALCOHOL IN THE PAST

          YEAR?TWO TO THREE TIMES PER WEEK (3 POINTS)

          POINTS4

          INTERPRETATIONPOSITIVE

           

           

          RECREATIONAL DRUG USE

          DRUG USE?NO

           

           

          CAFFEINE

          CAFFEINE USE?NO

           

           

          [employer]

          [id number] Muslim

           

           

          LANGUAGE

          LANGUAGES SPOKEN:ENGLISH

           

           

          LEARNING BARRIERS / SPECIAL NEEDS

          CHANGE FROM LAST VISIT?NO

          BARRIERS TO LEARNING?NO

          HEARING IMPAIRED?NO

          VISION IMPAIRED?YES GLASSES FOR READING

          COGNITIVELY IMPAIRED?NO

          READINESS TO LEARN?YES

          LEARNING PREFERENCES?NO

          LEARNING CAPABILITIES PRESENT?YES

          EMOTIONAL BARRIERS?NO

          SPECIAL DEVICES?NO

           NEEDED?NO

           

           

          PAIN CLINIC PFS, CLERGY, PUBLIC HEALTH REFERRALS

          PFS REFERRAL NEEDED?NO

          CLERGY REFERRAL NEEDED?NO

          PUBLIC HEALTH REFERRAL NEEDED?NO

          WAS THE PROVIDER NOTIFIED OF ANY PERTINENT INFO?NO

          HAS THE PATIENT BEEN EDUCATED REGARDING HIS/HER PLAN OF CARE?YES

          HAS THE PATIENT BEEN EDUCATED REGARDING PAIN, THE RISK FOR PAIN,

          THE IMPORTANCE OF EFFECTIVE PAIN MANAGEMENT, AND THE PAIN

          ASSESSMENT PROCESS?YES

           

           

          PATIENT: ____.

           

           

          ADVANCE DIRECTIVES

          HEALTH CARE PROXY?NO

          WOULD YOU LIKE MORE INFORMATION?YES GIVEN

          DO YOU HAVE A DNR?NO

          WOULD YOU LIKE MORE INFORMATION?NO

          LIVING WILL?NO

          WOULD YOU LIKE MORE INFORMATION?NO

          POWER OF ?NO

          WOULD YOU LIKE MORE INFORMATION?NO

           

           

          DOMESTIC VIOLENCE

          DO YOU FEEL SAFE IN YOUR ENVIRONMENT?YES

           

           

          REVIEWED, NO CHANGES.

           

REVIEW OF SYSTEMS

           

      REVIEWED BY:

           

          PROVIDER:    _____ .

           

      CONSTITUTIONAL:

           

          ANY CHANGE IN YOUR MEDICAL CONDITION?    NO . CHILLS    NO .

          FEVER    NO .

           

      INFECTION:

           

          DO YOU HAVE NEW INFECTIONS?    NO . DO YOU HAVE HISTORY OF MRSA? 

            NO .

           

      MUSCULOSKELETAL:

           

          ANY NEW PATTERNS OF PAIN OR NUMBNESS?    NO .

           

      GASTROENTEROLOGY:

           

          ANY NEW CHANGE IN BOWEL CONTROL?    NO .

           

      GENITOURINARY:

           

          ANY NEW CHANGE IN BLADDER CONTROL?    NO . IS THERE A CHANCE YOU

          COULD BE PREGNANT?    NO .

           

      HEMATOLOGY/LYMPH:

           

          DO YOU TAKE ANY BLOOD THINNERS? (FOR EXAMPLE- COUMADIN, PLAVIX,

          AGGRENOX, PLATEL, PRADAXA, OR XARELTO)    NO . WHEN WAS YOUR LAST

          DOSE?    DATE: TIME:  .

           

      NEUROLOGY:

           

          HAVE YOU FALLEN IN THE PAST 6 MONTHS?    NO . ANY NEW EXTREMITY

          NUMBNESS OR WEAKNESS?    NO .

           

      CARDIOLOGY:

           

          DO YOU HAVE A PACEMAKER OR DEFIBRILLATOR?    NO .

           

      RESPIRATORY:

           

          HAVE YOU BEEN SICK IN THE PAST WEEK?    NO . FEVER    NO . FLU

          LIKE SYMPTOMS?    NO . COUGH    NO .

           

      INTEGUMENTARY:

           

          DO YOU HAVE ANY RASHES OR OPEN SORES?    NO .

           

      ALLERGIC/IMMUNO:

           

          ARE YOU ALLERGIC TO SHELLFISH OR IV DYE?    NO . ANY NEW

          ALLERGIES?    NO .

           

      PSYCHIATRIC:

           

          DO YOU HAVE THOUGHTS OF HURTING YOURSELF OR SOMEONE ELSE?    NO .

          ARE YOU ABUSED, NEGLECTED, OR IN AN UNSAFE ENVIRONMENT?    NO .

           

      ENDOCRINOLOGY:

           

          ARE YOU DIABETIC?    NO .

           

      OTHER:

           

          DO YOU NEED ANY PRESCRIPTIONS?    NO . IF YES, PLEASE LIST:   

          ____ . ANY NEW PROBLEMS WITH YOUR MEDICATIONS?    NO . WHEN DID

          YOU LAST EAT?    ____0530 . WHEN DID YOU LAST DRINK?    ____1230

          . WHAT DID YOU LAST DRINK?    ____WATER . NAME OF PERSON DRIVING

          YOU HOME?    ____YELLOW [employer] . DO YOU HAVE ANY OTHER QUESTIONS OR

          CONCERNS    NO .

           

VITAL SIGNS

           

          .0 LBS, HT 63 IN, BMI 20.37 INDEX, /65 MM HG, HR 97

          /MIN, RR 16 /MIN, TEMP 97.3 F, OXYGEN SAT % 100%, NA INITIALS TL

          1252.

           

ASSESSMENTS

           

          MYALGIA - M79.1 (PRIMARY)

           

PROCEDURES

           

      PN TRIGGER POINT INJECTION WITH STEROIDS

          PRE PROCEDURE DIAGNOSIS    1. MYALGIA 2. PAIN AT LEFT NECK AREA,

          BILATERAL SHOULDER AREA, AND LEFT THORACIC AREA

          POST PROCEDURE DIAGNOSIS    1. MYALGIA 2. PAIN AT LEFT NECK AREA,

          BILATERAL SHOULDER AREA, AND LEFT THORACIC AREA

          PROCEDURE    TRIGGER POINT INJECTION AT LEFT NECK AREA, BILATERAL

          SHOULDER AREA, AND LEFT THORACIC AREA

          SURGEON    DR. RON RUSSELL

          ASSISTANT    NONE

          ANESTHESIA    LOCAL

          PRE PROCEDURE NOTE    THE PATIENT HAS A HISTORY OF CHRONIC PAIN

          AT THE LEFT NECK AREA, RIGHT AND LEFT SHOULDER AREA, AND LEFT

          THORACIC AREA. I EVALUATE THE PATIENT AND REVIEWED THE CHART.

          THERE IS EVIDENCE OF BANDS OF TISSUE WITH RESTRICTION OF MOVEMENT

          AND PRESENCE OF TRIGGER POINT AT THE AFFECTED AREA. I WENT OVER

          THE RISKS, ALTERNATIVES, AND BENEFITS ASSOCIATED WITH THIS

          PROCEDURE. THE PATIENT WOULD LIKE TO PROCEED AND GIVE CONSENT TO

          PERFORMED THE PROCEDURE. THE PATIENT DENIES UNEXPLAINABLE WEIGHT

          LOSS, FEVER, CHILLS, OR NEW CHANGES IN URINARY OR BOWEL CONTROL

          DESCRIPTION OF PROCEDURE    THE PATIENT WAS BROUGHT TO THE

          PROCEDURE ROOM AND PLACED IN THE SITTING POSITION. THE AREA WAS

          CLEANED WITH ALCOHOL. THE PROCEDURE WAS DONE USING ASEPTIC

          STERILE TECHNIQUE. I CHECKED LATERALITY AND THE LEVEL WHERE THE

          PROCEDURE WAS GOING TO BE PERFORMED WITH THE PATIENT AND THE

          SUPPORTING STAFF AT THE MOMENT OF THE TIME OUT IN THE PROCEDURE

          ROOM. USING A 25-GAUGE NEEDLE, TRIGGER POINTS WERE INJECTED AT

          THE LEFT NECK AREA, RIGHT AND LEFT SHOULDER AREA, AND LEFT

          THORACIC AREA WITH A TOTAL OF 40 ML OF BUPIVACAINE 0.25% AND

          KENALOG 40 MG. THERE WAS NO EVIDENCE OF BLOOD, PARESTHESIA OR

          CEREBROSPINAL FLUID DURING THE PROCEDURE. THE PATIENT WAS SENT TO

          THE RECOVERY ROOM. THE PATIENT WAS MOVING THE EXTREMITIES AND

          DOING WELL. THERE WAS NO COMPLICATION DURING THE PROCEDURE

          POST PROCEDURE NOTE    THE PATIENT WILL BE SEEN IN A FOLLOW UP IN

          THE NEXT FEW WEEKS. INSTRUCTIONS WERE GIVEN, QUESTIONS WERE

          ANSWERED, AND THE PATIENT EXPRESSED UNDERSTANDING AND AGREES WITH

          THE PLAN. I, MARIELLA MILLAN, DOCUMENTED THE ABOVE INFORMATION

          ACTING AS A SCRIBE FOR DR. RUSSELL. I, DR. RUSSELL, HAVE

          REVIEWED THE ABOVE DOCUMENT, SCRIBED BY MARIELLA MILLAN, AND I

          VERIFY THAT IT IS ACCURATE

           

PROCEDURE CODES

           

          25220 INJECT TRIGGER POINTS 3/>

           

DISPOSITION & COMMUNICATION

           

FOLLOW UP

           

          3 WEEKS

           

 

ELECTRONICALLY SIGNED BY RON RUSSELL MD ON

          2017 AT 11:02 PM EST

           

           

           

 

DISCLAIMER :

THIS IS A VISIT SUMMARY EXTRACTED FROM THE [employer] CHART.

IT IS NOT A COPY OF THE [employer] PROGRESS NOTE.

MTDRICHMOND

## 2019-03-28 NOTE — REP
Clinical:  Acute chest pain .

 

Comparison:  None .

 

Findings:

The mediastinum and cardiac silhouette are stable and within normal limits for

portable technique.  The lung fields are clear without acute consolidation,

effusion, or pneumothorax.  Skeletal structures are intact.

 

Impression:

No acute cardiopulmonary process appreciated.

 

 

Electronically Signed by

Cachorro Mcmanus MD 03/28/2019 10:21 P

## 2019-03-29 NOTE — ECGEPIP
Stationary ECG Study

                           [facility] - ED

                                       

                                       Test Date:    2019

Pat Name:     MOR SANCHEZ        Department:   

Patient ID:   [id number]                 Room:         -

Gender:       F                        Technician:   

:          1965               Requested By: ASHLY HERNANDEZ 

Order Number: [id number]     Reading MD:   Maja Lundborg-Gray

                                 Measurements

Intervals                              Axis          

Rate:         69                       P:            66

LA:           139                      QRS:          70

QRSD:         86                       T:            77

QT:           388                                    

QTc:          418                                    

                           Interpretive Statements

SINUS RHYTHM

POSSIBLE LEFT ATRIAL ENLARGEMENT

POSSIBLE RIGHT VENTRICULAR CONDUCTION DELAY

MINIMAL ST DEPRESSION

 

 3/15/16 RATE INCREASED

NONSPECIFIC ST T WAVE CHANGES

Electronically Signed On 3- 17:00:46 EDT by Maja Lundborg-Gray

## 2019-08-16 NOTE — REP
LEFT RIB SERIES:  Four views.

 

HISTORY:  Trauma.

 

Comparison radiograph is from March 28, 2019.

 

FINDINGS:  Four views of the left rib cage are presented.  These demonstrate no

evidence of rib fracture or bony destructive lesion.  Visualized lung fields are

clear.

 

IMPRESSION: Negative left rib series.

 

 

Electronically Signed by

Zach Worthy MD 08/16/2019 02:17 P

## 2019-08-16 NOTE — REP
HISTORY: Trauma. Assess for possible pneumothorax.

 

COMPARISON: A portable of 03/28/2019.

 

Subtle opacities have developed on this limited frontal view in the left lower

lobe with subtle left CP angle blunting. The lung fields are otherwise clear and

the heart is not enlarged. There is no evidence of a pneumothorax.

 

IMPRESSION:

 

Subtle left basilar opacities of uncertain etiology, possible subsegmental

atelectatic change, however, correlate clinically with appropriate followup.

 

 

Electronically Signed by

Adryan Dalton DO 08/16/2019 04:05 P

## 2019-08-16 NOTE — REP
LUMBAR SPINE SERIES:  Five views.

 

HISTORY:  Trauma.

 

COMPARISON STUDY:  April 12, 2018.

 

FINDINGS:  There is a mild levoconvex curvature on the AP view.  Lumbar vertebral

body heights are preserved.  No fracture or collapse is seen.  There is mild disc

space narrowing and spurring at L3-4 and to a lesser extent L2-3.  There is

osteoarthritic facet hypertrophy and sclerosis bilaterally at 3-4, 4-5 and 5-1.

Sacrum and SI joints are intact.  Psoas margins are symmetric.  The visualized

bowel gas pattern is unremarkable.

 

IMPRESSION: Degenerative spondylosis changes.  No traumatic abnormality is

appreciated.  Mild levoconvex curvature.

 

 

Electronically Signed by

Zach Worthy MD 08/16/2019 03:22 P

## 2019-12-19 NOTE — ECWPNPC
PATIENT NAME: MOR SANCHEZ

: 1965

GENDER: FEMALE

MRN: [id number]

VISIT DATE: 2019

DISCHARGE DATE: 19 0000

VISIT LOCKED DATE TIME: 

PHYSICIAN: KEIRA ABAD

PHYSICIAN PAGER NO: [phone]

RESOURCE: KEIRA ABAD

 

           

           

REASON FOR APPOINTMENT

           

          1. CERVICAL

           

HISTORY OF PRESENT ILLNESS

           

      PAIN SCREENIN Y/O FEMALE REFERRED BY DR OLSON FOR EVALUATION OF CHRONIC GENERALIZED BODY PAIN.WAS

          BEING FOLLOWED AT OUR CLINIC BY BIGG HERNADEZ FOR SEVERAL

          YEARS AND STOPPED COMING ABRUPTLY AS SHE FELT IT WASNT EFFECIENT

          OR EFFECTIVE.FRUSTRATED WITH SCHEDULING OF THIS APPOINTMENT.WHEN

          DISCUSSING NEED FOR UPDATED MRI OF L/S SPINE AND NECK AND NEED TO

          ATTEND PT IN ORDER TO GET MRI IMAGING WE WOULD NEED PER HER

          INSURANCE COVERAGE PATIENT BECAME VERY AGITATED.SHE HAS HX OF

          FALLING ON BACK GOING DOWN STAIRS IN .[place] LUMBAR XRAY AT

          ER.STATES SHE WAS ON NSAIDS FOR KNEE PAIN THAT WERE

          INEFFECTIVE.SHE IS VOICING FRUSTRATION AND IS VERBALLY DEGRADING

          ALL MEDICAL INSTITUTIONS IN THIS AREA.DUE TO PATIENTS FRUSTRATION

          I REVIEWED CASE WITH DANA VILLARREAL CLINIC MANAGER WHO CAME IN TO

          TALK WITH PATIENT.

      PATIENT HAS A COMPLAINT OF ACUTE OR CHRONIC PAIN

           

           

          :YES

           

      FALL RISK SCREENING:

      SCREENING

           

           

          :NO FALLS REPORTED IN THE LAST YEAR

           

CURRENT MEDICATIONS

           

          TAKING VALTREX 500MG TABLET 1 TAB PLANNED PARENTHOOD ORALLY BID

          TAKING FEMRING 0.05 MG/24HR RING VAGINAL

          TAKING LEVOTHYROXINE SODIUM 88 MCG TABLET 1 TABLET ORALLY ONCE A

          DAY

          TAKING MULTIVITAMINS OTC TABLET 1 TABLET ORALLY ONCE A DAY

          TAKING BIOTIN 1000 MCG TABLET 1 TABLET ORALLY ONCE A DAY

          TAKING FLUCONAZOLE 150 MG TABLET ORAL

          TAKING PLAQUENIL 200 MG TABLET 1 TABLET WITH FOOD OR MILK ORALLY

          ONCE A DAY, NOTES: ARTHRITIS

          TAKING VENTOLIN  (90 BASE) MCG/ACT AEROSOL SOLUTION 2

          PUFFS AS NEEDED INHALATION EVERY 6 HRS

          TAKING CETIRIZINE HCL 10 MG TABLET 1 TABLET ORALLY ONCE A DAY

          TAKING FLONASE 50 MCG/ACT SUSPENSION 1 PUFF IN EACH NOSTRIL

          NASALLY ONCE A DAY AS NEEDED

          TAKING VALACYCLOVIR  MG TABLET TAKE ONE TABLET BY MOUTH

          TWICE A DAY

          TAKING ARNUITY ELLIPTA 100 MCG/ACT AEROSOL POWDER BREATH

          ACTIVATED INHALE ONE PUFF BY MOUTH EVERY DAY AT THE SAME TIME

          INHALATION

          TAKING ZOFRAN ODT 4 MG TABLET DISPERSIBLE 1 TABLET ON THE TONGUE

          AND ALLOW TO DISSOLVE ORALLY EVERY 8 HRS PRN NAUSEA

          TAKING OMEPRAZOLE 20MG 20MG TABLET 1 TAB BEFORE MEAL ORAL ONCE A

          DAY

          TAKING PENLAC 8% SOLUTION 1 DROP TO AFFECTED TOE NAILS DAILY,

          CLEAN OFF WITH ALCOHOL EVERY 7 DAYS EXTERNALLY ONCE A DAY

          NOT-TAKING NAPROXEN 500 MG TABLET 1 TAB ORALLY TWICE DAILY AS

          NEEDED FOR PAIN

          NOT-TAKING BLEPH-10 10 % SOLUTION 2 DROP INTO AFFECTED EYE

          OPHTHALMIC FOUR TIMES DAILY

          NOT-TAKING PERCOCET 5-325 MG TABLET 1 TABLET ORALLY ONCE PER DAY

          MDD=1 FOR PAIN AS NEEDED

          NOT-TAKING AMOXICILLIN-POT CLAVULANATE 875-125 MG TABLET ORAL

          DISCONTINUED SYMBICORT 80-4.5 MCG/ACT AEROSOL 2 PUFFS INHALATION

          TWICE A DAY

          MEDICATION LIST REVIEWED AND RECONCILED WITH THE PATIENT

           

PAST MEDICAL HISTORY

           

          HYPERACTIVE THYROID TX'D W RADIOACTIVE IODINE; POST GRAVES

          DISEASE

          ESOPHAGEAL REFLUX

          HS 2

          DEPRESSION/ANXIETY

          SUBSTANCE ABUSE

          BARTHOLIN'S GLAND CYST

          SKIN CA NOSE--? BCCA

          ECHO 5/15: NORMAL (EF75%, NO ABNL)

          HOLTER 7/15: NORMAL, WAS IN NSR DURING SYMPTOMS

          ABPM 8/15:MEAN SBP 130S

          +RF , MILD RA SX (SEE  NOTE)

          DERMOID CYST RT PELVIS CT 2016

          SJOGREN'S

          ALLERGIC RHINITIS - WEEKLY ALLERGY SHOTS

          DEXA DONE AT [facility] , FRAX: MAJOR OSTEOPOROTIC 3.9

          AND HIP FRACTURE 0.3

           

ALLERGIES

           

          DEMEROL: NAUSEA/VOMITING - ALLERGY

          REGLAN: EKG ABNORMALITIES - ALLERGY

          PREMPRO: PALPITATIONS - SIDE EFFECTS

          PLAQUENIL: PALPITATIONS - SIDE EFFECTS

           

SURGICAL HISTORY

           

          TONSILLECTOMY

          COLPOSCOPY

          LEEP -(PLANNED PARENTHOOD) 

          HYSTERECTOMY 17

          COLONSCOPY REINDL 

           

FAMILY HISTORY

           

          FATHER: ALIVE 69 YRS

          MOTHER: ALIVE 70 YRS

          1 BROTHER(S) .

           

SOCIAL HISTORY

           

          GENERAL:

           

          TOBACCO USE

          ARE YOU A:NONSMOKER

           

           

          HIV / HEP-C SCREENING

          HIV TEST OFFERED TO PATIENT:YES

          DATE OFFERED:2018

          TEST ACCEPTED:NO

          HEP-C TEST OFFERED TO PATIENT:YES

          DATE OFFERED:2018

          REASON:PATIENT DECLINED

          TEST ACCEPTED:NO

          REASON:PATIENT DECLINED

           

           

          HOUSING: RENTS APARTMENT.

           

           

          EDUCATION

          LEVEL OF EDUCATION:NOT FINISHED COLLEGE

           

           

          LANGUAGE

          LANGUAGES SPOKEN:ENGLISH

           

           

          DOMESTIC VIOLENCE

          DO YOU FEEL SAFE IN YOUR ENVIRONMENT?YES

           

           

          RECREATIONAL DRUG USE

          DRUG USE?YES MARIJUANA

           

           

          EXERCISE: NO REGULAR EXERCISE.

           

           

          LEARNING BARRIERS / SPECIAL NEEDS

          CHANGE FROM LAST VISIT?NO

          BARRIERS TO LEARNING?NO

          HEARING IMPAIRED?NO

          VISION IMPAIRED?YES GLASSES FOR READING

          COGNITIVELY IMPAIRED?NO

          READINESS TO LEARN?YES

          LEARNING PREFERENCES?NO

          LEARNING CAPABILITIES PRESENT?YES

          EMOTIONAL BARRIERS?NO

          SPECIAL DEVICES?NO

           NEEDED?NO

           

           

          PAIN CLINIC PFS, CLERGY, PUBLIC HEALTH REFERRALS

          PFS REFERRAL NEEDED?NO

          CLERGY REFERRAL NEEDED?NO

          PUBLIC HEALTH REFERRAL NEEDED?NO

          WAS THE PROVIDER NOTIFIED OF ANY PERTINENT INFO?NO

          HAS THE PATIENT BEEN EDUCATED REGARDING HIS/HER PLAN OF CARE?YES

          HAS THE PATIENT BEEN EDUCATED REGARDING PAIN, THE RISK FOR PAIN,

          THE IMPORTANCE OF EFFECTIVE PAIN MANAGEMENT, AND THE PAIN

          ASSESSMENT PROCESS?YES

           

           

          LATEX QUESTIONNAIRE

          LATEX ALLERGY : HAVE YOU EVER DEVELOPED ANY TYPE OF REACTION

          AFTER HANDLING LATEX PRODUCTS SUCH AS RUBBER GLOVES, CONDOMS,

          DIAPHRAGMS, BALLOONS, SOCKS, OR UNDERWEAR?NO

          LATEX ALLERGY : HAVE YOU EVER DEVELOPED ANY TYPE OF REACTION

          DURING OR AFTER DENTAL APPOINTMENT, VAGINAL/RECTAL EXAMINATION,

          SURGICAL PROCEDURE, OR ANY OTHER EXPOSURE?NO

          DATE ASKED : 2019

          LATEX RISK : HAVE YOU EVER HAD ANY DIFFICULTY BREATHING OR HIVES

          AFTER EATING OR HANDLING ANY FRUITS, OR VEGETABLES; SUCH AS KIWI,

          BANANAS, STONE FRUITS, OR CHESTNUTSNO

          LATEX RISK : DO YOU HAVE A PREVIOUS PERSONAL HISTORY OF MORE THAN

          NINE SURGERIES, SPINA BIFIDA, OR REPEATED CATHERIZATIONS? NO

          LATEX RISK : ARE YOU FREQUENTLY EXPOSED TO LATEX PRODUCTS IN YOUR

          OCCUPATION?NO

           

           

          CAFFEINE

          CAFFEINE USE?NO

           

           

          ADVANCE DIRECTIVE

          ADVANCE DIRECTIVE DISCUSSED WITH PATIENT:YES PT DECLINES INFO AT

          THIS TIME

           

           

          Yaz[employer]ism

          [id number]

           

           

          MARITAL STATUS: ..

           

           

          ALCOHOL SCREENING

          DID YOU HAVE A DRINK CONTAINING ALCOHOL IN THE PAST YEAR?YES

          HOW OFTEN DID YOU HAVE SIX OR MORE DRINKS ON ONE OCCASION IN THE

          PAST YEAR?LESS THAN MONTHLY (1 POINT)

          HOW MANY DRINKS DID YOU HAVE ON A TYPICAL DAY WHEN YOU WERE

          DRINKING IN THE PAST YEAR?1 OR 2 (0 POINTS)

          HOW OFTEN DID YOU HAVE A DRINK CONTAINING ALCOHOL IN THE PAST

          YEAR?TWO TO THREE TIMES PER WEEK (3 POINTS)

          POINTS4

          INTERPRETATIONPOSITIVE

           

           

          SEXUAL HX

          HAD SEX IN THE LAST 12 MONTHS (VAGINAL, ORAL, OR ANAL)?NO

          LMP:HYSTER

          HAVE YOU EVER HAD AN STD?YES

          OTHER?NO

          HERPES?YES

          SYPHILIS?NO

          GC?NO

          CHLAMYDIA?NO

           

           

          REVIEWED, NO CHANGESREVIEWED 10/15/18 1500 BVREVIEWED WITH

          PATIENT 10/29/18 1119 JS.

           

HOSPITALIZATION/MAJOR DIAGNOSTIC PROCEDURE

           

          SURGERY RELATED

          OVERACTIVE THYROID

           

REVIEW OF SYSTEMS

           

      REVIEWED BY:

           

          PROVIDER:    KEIRA HERNADEZ .

           

      CONSTITUTIONAL:

           

          ANY CHANGE IN YOUR MEDICAL CONDITION?    NO . CHILLS    NO .

          FEVER    NO .

           

      INFECTION:

           

          DO YOU HAVE NEW INFECTIONS?    NO . DO YOU HAVE HISTORY OF MRSA? 

            NO .

           

      MUSCULOSKELETAL:

           

          ANY NEW PATTERNS OF PAIN OR NUMBNESS?    NO . SYTEMIC LUPUS    NO

          .

           

      GASTROENTEROLOGY:

           

          ANY NEW CHANGE IN BOWEL CONTROL?    NO . BARRETTS ESOPHAGUS    NO

          . CIRRHOSIS    NO . HEPATITIS    NO . LIVER FAILURE    NO . ACID

          REFLUX    NO . UNEXPLAINED WEIGHT LOSS    NO .

           

      GENITOURINARY:

           

          ANY NEW CHANGE IN BLADDER CONTROL?    NO . IS THERE A CHANCE YOU

          COULD BE PREGNANT?    NO .

           

      HEMATOLOGY/LYMPH:

           

          DO YOU TAKE ANY BLOOD THINNERS? (FOR EXAMPLE- COUMADIN, PLAVIX,

          AGGRENOX, PLATEL, PRADAXA, OR XARELTO)    NO . WHEN WAS YOUR LAST

          DOSE?    DATE: TIME:  . LOW PLATELET COUNT    NO . SICKLE CELL

          DISEASE    NO . VON WILLIEBRANDS    NO . FACTOR V LEIDEN    NO .

          THALLASEMIA    NO . ANEMIA    NO . EASY BRUISING    NO .

           

      NEUROLOGY:

           

          HAVE YOU FALLEN IN THE PAST 12 MONTHS?    YES, FELL 2019

          TRIPPED ON THROW RUG . ANY NEW EXTREMITY NUMBNESS OR WEAKNESS?   

          YES, BILAT ARM PAIN AND WEAKNESS . HEAD INJURY    NO . DEMENTIA  

           NO . CEREBRAL PALSY    NO . MULTIPLE SCLEROSIS    NO . DIZZINESS

             NO . HEADACHE    NO . STROKES    NO . VERTIGO    NO .

           

      CARDIOLOGY:

           

          DO YOU HAVE A PACEMAKER OR DEFIBRILLATOR?    NO . ANGINA    NO .

          HEART ATTACK    NO . HEART SURGERY    NO . CONGESTIVE HEART

          FAILURE/FLUID OVERLOAD    NO . CHEST PAIN    NO . HIGH BLOOD

          PRESSURE    NO . IRREGULAR HEART BEAT    NO .

           

      RESPIRATORY:

           

          HAVE YOU BEEN SICK IN THE PAST WEEK?    NO . FEVER    NO . FLU

          LIKE SYMPTOMS?    NO . CPAP    NO . BYPAP    NO . ASTHMA    NO .

          EMPHYSEMA    NO . CHRONIC LUNG DISEASES    NO . SHORTNESS OF

          BREATH ON EXERTION    NO . COUGH    NO . SNORING    NO .

           

      INTEGUMENTARY:

           

          DO YOU HAVE ANY RASHES OR OPEN SORES?    NO .

           

      ALLERGIC/IMMUNO:

           

          ARE YOU ALLERGIC TO IV DYE?    NO . ANY NEW ALLERGIES?    NO .

           

      PSYCHIATRIC:

           

          DO YOU HAVE THOUGHTS OF HURTING YOURSELF OR SOMEONE ELSE?    NO .

          ARE YOU ABUSED, NEGLECTED, OR IN AN UNSAFE ENVIRONMENT?    NO .

           

      ENDOCRINOLOGY:

           

          ARE YOU DIABETIC?    NO . THYROID DISORDER    YES, ON MEDS .

           

      OTHER:

           

          DO YOU NEED ANY PRESCRIPTIONS?    YES, PERCOSET . IF YES, PLEASE

          LIST:    ____ . ANY NEW PROBLEMS WITH YOUR MEDICATIONS?    NO .

          WHEN DID YOU LAST EAT?    ____ . WHEN DID YOU LAST DRINK?    ____

          . WHAT DID YOU LAST DRINK?    ____ . NAME OF PERSON DRIVING YOU

          HOME?    ____ . DO YOU HAVE ANY OTHER QUESTIONS OR CONCERNS   

          YES, NERVE DAMAGE C5-C6 CARPAL TUNNEL BOTH ARMS .

           

VITAL SIGNS

           

           LBS, HT 63 IN, BMI 21.08 INDEX, /74 MM HG, HR 84

          /MIN, RR 18 /MIN, TEMP 97.2 F, OXYGEN SAT % 100%, NA INITIALS SC

          13:11, REVIEWED BY: EM.

           

EXAMINATION

           

      GENERAL EXAMINATION: DEFERRED DUE TO PATIENTS

          FRUSTRATION AND DESIRE TO LEAVE.

           

ASSESSMENTS

           

          ACUTE PAIN DUE TO TRAUMA - G89.11 (PRIMARY)

           

          LOW BACK PAIN - M54.5

           

TREATMENT

           

      ACUTE PAIN DUE TO TRAUMA

          NOTES: DUE TO THE FACT THAT PATIENT HAS NOT HAD PT FOR PERSISTENT

          LUMBAR PAIN POST INJURY IT WOULD BE MY RECOMMENDATION TO ATTEND

          PT.PATIENT IS REFUSING PT.WALKED OUT OF CLINIC VERBALIZING

          PROFANITIES.

           

PROCEDURE CODES

           

           ESTABILISHED PATIENT [facility] CHARGE

           

DISPOSITION & COMMUNICATION

           

 

ELECTRONICALLY SIGNED BY KEIRA HERNADEZ, MARICARMEN ON

          2019 AT 03:38 PM EST

           

           

           

 

DISCLAIMER :

THIS IS A VISIT SUMMARY EXTRACTED FROM THE [employer] CHART.

IT IS NOT A COPY OF THE [employer] PROGRESS NOTE.

DESMOND

## 2020-01-22 ENCOUNTER — HOSPITAL ENCOUNTER (OUTPATIENT)
Dept: HOSPITAL 53 - M WHC | Age: 55
End: 2020-01-22
Attending: NURSE PRACTITIONER
Payer: COMMERCIAL

## 2020-01-22 DIAGNOSIS — Z80.3: ICD-10-CM

## 2020-01-22 DIAGNOSIS — Z12.31: Primary | ICD-10-CM

## 2020-01-22 DIAGNOSIS — Z85.9: ICD-10-CM

## 2020-01-22 DIAGNOSIS — Z78.0: ICD-10-CM

## 2020-01-22 NOTE — REPMRS
Patient History

The patient states she had a clinical breast exam in 11/2019.

Patient is postmenopausal, has history of other cancer at age 32,

and is nulliparous.

Family history of breast cancer in maternal grandmother, 

endometrial cancer in maternal aunt.

Took hormonal contraceptives for 23 years.  Took estrogen for 1 

year 6 months.

 

Digital Woman Screen Mammo: January 22, 2020 - Exam #: 

[id number]

Bilateral CC and MLO view(s) were taken.

 

Technologist: Britta Redd, Technologist

Prior study comparison: October 30, 2018, bilateral digital woman

screen mammo performed at [facility].  August 24, 2017, digital woman screen mammo performed at 

[facility].  July 25, 2016, 

digital woman screen mammo performed at [facility].

 

FINDINGS: The breast tissue is heterogeneously dense.  This may 

lower the sensitivity of mammography.  There is a moderate amount

of heterogeneously dense fibroglandular tissue which is fairly 

symmetric. There is no interval development of dominant mass, 

architectural distortion, or grouped microcalcification typical 

of malignancy. There has been no change in the appearance of the 

mammogram from the prior studies.

3-D tomosynthesis shows no additional findings.

 

Assessment: BI-RADS/ACR category 1 mammogram. Negative Mammogram.

 

Recommendation

Routine screening mammogram of both breasts in 1 year (for women 

over age 40).

This patient's Lifetime Breast Cancer RIsk is estimated at 16.3 

%.

This mammogram was interpreted with the aid of an FDA-approved 

computer-aided dectection system.

 

Electronically Signed By: Heri Worthy MD 01/22/20 1640

## 2020-01-23 ENCOUNTER — HOSPITAL ENCOUNTER (OUTPATIENT)
Dept: HOSPITAL 53 - M ADAMS | Age: 55
End: 2020-01-23
Attending: PHYSICIAN ASSISTANT
Payer: COMMERCIAL

## 2020-01-23 DIAGNOSIS — M79.671: Primary | ICD-10-CM

## 2020-01-24 NOTE — REP
Clinical:  Pain.

 

Technique:  Axial and lateral views of the right calcaneus.

 

Findings:  No acute fracture dislocation.  Osseous structures and joint spaces

appear intact and relatively age-appropriate.  Surrounding soft tissues are

grossly unremarkable.

 

Impression:

Age-appropriate examination.

 

 

Electronically Signed by

Cachorro Mcmanus MD 01/24/2020 02:45 A

## 2020-01-31 ENCOUNTER — HOSPITAL ENCOUNTER (OUTPATIENT)
Dept: HOSPITAL 53 - M ADAMS | Age: 55
End: 2020-01-31
Attending: FAMILY MEDICINE
Payer: COMMERCIAL

## 2020-01-31 DIAGNOSIS — M25.552: Primary | ICD-10-CM

## 2020-01-31 NOTE — REP
Left hip two views :

There is no fracture or dislocation.

Mineralization and joint spaces are normal.

There are no calcifications or foreign bodies.

Impression:

Negative Left hip.

If symptoms persist or worsen, consider MRI. .

 

 

 

 

Electronically Signed by

Garrett Xiong MD 01/31/2020 03:23 P

## 2020-02-19 ENCOUNTER — HOSPITAL ENCOUNTER (OUTPATIENT)
Dept: HOSPITAL 53 - M LAB REF | Age: 55
End: 2020-02-19
Attending: PHYSICIAN ASSISTANT
Payer: COMMERCIAL

## 2020-02-19 DIAGNOSIS — J11.1: Primary | ICD-10-CM

## 2020-02-19 LAB
FLUAV RNA UPPER RESP QL NAA+PROBE: NEGATIVE
FLUBV RNA UPPER RESP QL NAA+PROBE: NEGATIVE

## 2020-02-22 ENCOUNTER — HOSPITAL ENCOUNTER (OUTPATIENT)
Dept: HOSPITAL 53 - M RAD | Age: 55
End: 2020-02-22
Attending: ORTHOPAEDIC SURGERY
Payer: COMMERCIAL

## 2020-02-22 DIAGNOSIS — M47.812: ICD-10-CM

## 2020-02-22 DIAGNOSIS — M50.30: Primary | ICD-10-CM

## 2020-02-22 DIAGNOSIS — D18.09: ICD-10-CM

## 2020-02-22 NOTE — REPVR
PROCEDURE INFORMATION: 

Exam: MR Lumbar Spine Without Contrast. 

Exam date and time: 2/22/2020 9:19 AM 

Age: 54 years old 

Clinical indication: Low back pain; Additional info: Lbp 



TECHNIQUE: 

Imaging protocol: Multiplanar magnetic resonance images of the lumbar spine 

without intravenous contrast. 



COMPARISON: 

CR Spine. Lumbosacral, complete 8/16/2019 12:20 PM 



FINDINGS: 

Vertebrae: Unremarkable. No fracture.

Spinal cord: Normal signal. No cord compression. 

L1-L2:  No significant disc disease. No significant spinal canal stenosis. No 

neural foraminal stenosis. 

L2-L3: Normal disc. Mild bilateral facet hypertrophy. No spinal stenosis or 

nerve root impingement. 

L3-L4: Mild disc space height loss. Minimal posterior broad-based disc bulge. 

Bilateral facet hypertrophy. Mild bone marrow edema signal within the superior 

and inferior articulating facets bilaterally consistent with degenerative facet 

arthrosis. Grade 1 anterolisthesis of L3. No focal disc protrusion, nerve root 

impingement or spinal stenosis. 

L4-L5: Right foraminal focal disc bulge without definite evidence of nerve root 

impingement. Mild posterior broad-based disc bulge. Moderate bilateral facet 

hypertrophy and ligamentum flavum thickening. No spinal stenosis. 

L5-S1:  No significant disc disease. No significant spinal canal stenosis. No 

neural foraminal stenosis. 



Soft tissues: Unremarkable. 



IMPRESSION: 

Lumbar degenerative disc disease and facet arthrosis with grade 1 

anterolisthesis of L3. 



Electronically signed by: Chong Lopez On 02/22/2020  11:47:24 AM

## 2020-02-22 NOTE — REPVR
PROCEDURE INFORMATION: 

Exam: MR Cervical Spine Without Contrast 

Exam date and time: 2/22/2020 10:42 AM 

Age: 54 years old 

Clinical indication: Pain; Cervicalgia; Additional info: Other cervical disc 

degeneration, unsp cervical region 



TECHNIQUE: 

Imaging protocol: Multiplanar magnetic resonance images of the cervical spine 

without contrast. 



COMPARISON: 

No relevant prior studies available. 



FINDINGS: 

Limitations: This examination is slightly suboptimal secondary to motion 

artifact. 

Vertebrae: Small hemangioma within the C7 vertebral body. No fracture. 

Degenerative disc disease and facet arthrosis is present throughout the 

cervical spine. 

Spinal cord: Normal signal. No cord compression. 

C2-C3: No significant disc disease. No significant spinal stenosis. 

C3-C4: Mild left uncovertebral joint hypertrophy resulting in minimal narrowing 

of the left neural foramen. No disc protrusion or cord impingement. 

C4-C5: Mild posterior disco-osteophytic ridging and bilateral uncovertebral 

joint hypertrophy. No focal disc protrusion, cord impingement or severe spinal 

stenosis. Mild effacement of the anterior thecal sac. Mild bilateral neural 

foraminal narrowing. 

C5-C6: Posterior disco-osteophytic protrusion, asymmetric to the right. Bone 

marrow edema signal within the inferior endplate of C5 and superior endplate of 

C6. Mild spinal stenosis. Bilateral uncovertebral joint hypertrophy. Moderate 

narrowing of the right neural foramen. Mild narrowing of the left neural 

foramen. 

C6-C7: No significant disc disease. No significant spinal stenosis. 

C7-T1: No significant disc disease. No significant spinal stenosis. 



Vertebral arteries: Expected flow voids in the vertebral arteries. 

Soft tissues: Unremarkable. 



IMPRESSION: 

Degenerative spondylosis of the cervical spine, most severe at C5-C6. 



Electronically signed by: Chong Lopez On 02/22/2020  11:41:35 AM

## 2020-04-19 ENCOUNTER — HOSPITAL ENCOUNTER (OUTPATIENT)
Dept: HOSPITAL 53 - M ADAMS | Age: 55
End: 2020-04-19
Attending: NURSE PRACTITIONER
Payer: COMMERCIAL

## 2020-04-19 DIAGNOSIS — E89.0: Primary | ICD-10-CM

## 2020-04-19 LAB
T4 FREE SERPL-MCNC: 1.07 NG/DL (ref 0.76–1.46)
TSH SERPL DL<=0.005 MIU/L-ACNC: 0.93 UIU/ML (ref 0.36–3.74)

## 2020-05-30 ENCOUNTER — HOSPITAL ENCOUNTER (EMERGENCY)
Dept: HOSPITAL 53 - M ED | Age: 55
LOS: 1 days | Discharge: HOME | End: 2020-05-31
Payer: COMMERCIAL

## 2020-05-30 VITALS — WEIGHT: 115.52 LBS | BODY MASS INDEX: 21.26 KG/M2 | HEIGHT: 62 IN

## 2020-05-30 DIAGNOSIS — Z79.899: ICD-10-CM

## 2020-05-30 DIAGNOSIS — Z88.8: ICD-10-CM
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
## 2020-05-30 DIAGNOSIS — S52.501A: Primary | ICD-10-CM

## 2020-05-30 DIAGNOSIS — E05.00: ICD-10-CM
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
## 2020-05-30 PROCEDURE — 73080 X-RAY EXAM OF ELBOW: CPT

## 2020-05-30 PROCEDURE — 94760 N-INVAS EAR/PLS OXIMETRY 1: CPT

## 2020-05-30 PROCEDURE — 96374 THER/PROPH/DIAG INJ IV PUSH: CPT

## 2020-05-30 PROCEDURE — 99285 EMERGENCY DEPT VISIT HI MDM: CPT

## 2020-05-30 PROCEDURE — 25605 CLTX DST RDL FX/EPHYS SEP W/: CPT

## 2020-05-30 PROCEDURE — 73110 X-RAY EXAM OF WRIST: CPT

## 2020-05-30 PROCEDURE — 96375 TX/PRO/DX INJ NEW DRUG ADDON: CPT

## 2020-05-30 PROCEDURE — 96361 HYDRATE IV INFUSION ADD-ON: CPT

## 2020-05-30 PROCEDURE — 73100 X-RAY EXAM OF WRIST: CPT

## 2020-05-31 VITALS — SYSTOLIC BLOOD PRESSURE: 125 MMHG | DIASTOLIC BLOOD PRESSURE: 82 MMHG

## 2020-05-31 NOTE — REP
C-ARM VIEWS, RIGHT WRIST:

 

Three C-arm views right wrist performed.

 

There is reduction of the comminuted fracture of the distal radius, which is now

well aligned. There is an overlying splint.

 

Fluoroscopy time:  6.4 seconds.

 

 

Electronically Signed by

Garrett Whiteside MD 05/31/2020 10:27 A

## 2020-05-31 NOTE — REP
RIGHT WRIST, FOUR VIEWS:

 

Four views, right wrist performed. Overlying splint obscures underlying osseous

detail. There is a comminuted intra-articular fracture of the distal radius,

which is impacted and demonstrates dorsal angulation and displacement. No other

fracture or dislocation is seen.

 

 

Electronically Signed by

Garrett Whiteside MD 05/31/2020 10:27 A

## 2020-05-31 NOTE — REP
RIGHT ELBOW, THREE VIEWS:

 

There is no evidence of an acute fracture, dislocation or intrinsic bone disease.

A splint overlies the radius and ulna limiting evaluation.

 

IMPRESSION:

 

No fracture or dislocation.

 

 

Electronically Signed by

Garrett Whiteside MD 05/31/2020 10:27 A
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
## 2020-10-15 ENCOUNTER — HOSPITAL ENCOUNTER (OUTPATIENT)
Dept: HOSPITAL 53 - M LAB | Age: 55
End: 2020-10-15
Attending: INTERNAL MEDICINE
Payer: COMMERCIAL

## 2020-10-15 DIAGNOSIS — E89.0: Primary | ICD-10-CM

## 2020-10-15 LAB
T4 FREE SERPL-MCNC: 0.97 NG/DL (ref 0.76–1.46)
TSH SERPL DL<=0.005 MIU/L-ACNC: 2.21 UIU/ML (ref 0.36–3.74)

## 2020-11-06 ENCOUNTER — HOSPITAL ENCOUNTER (OUTPATIENT)
Dept: HOSPITAL 53 - M WHC | Age: 55
End: 2020-11-06
Attending: INTERNAL MEDICINE
Payer: COMMERCIAL

## 2020-11-06 DIAGNOSIS — M85.9: Primary | ICD-10-CM

## 2020-11-06 NOTE — DEXA
INDICATION:

M85.89 [facility] DISRD OF BONE DENSITY AND STRUCTURE.



COMPARISON:

05/21/2018.



TECHNIQUE:

Bone density was measured using dual-energy x-ray absorptiometry (DEXA).



FINDINGS:

AP SPINE L1-L4

BMD 1.026 g/cm2

Young Adult T-Score is -1.4

Age Matched Z-Score -0.7.



LT FEMUR, TOTAL

BMD 0.73 g/cm2

Young Adult T-Score -1.8

Age Matched Z-Score -1.2.



LT NECK

BMD 0.746 g/cm2

Young Adult T-Score -2.1

Age Matched Z-Score -1.2.



RT FEMUR, TOTAL

BMD 0.811 g/cm2

Young Adult T-Score -1.6

Age Matched Z-Score -1.0.



RT NECK

BMD 0.746 g/cm2

Young Adult T-Score -2.1

Age Matched Z-Score -1.2.



IMPRESSION:

There is low bone density of the spine.



There is low bone density of the left hip.





There is low bone density of the right hip.



The density of the spine has decreased 5.5% since the initial exam on 05/21/2018.











The density of the left hip has decreased 6.5% since initial exam on 05/21/2018.











The density of the right hip has decreased 6.8% since the initial exam on 05/21/2018.







FOLLOW-UP:

Recommendation for the next bone density exam: 2 years.





<Electronically signed by Garrett Whiteside > 11/06/20 [id number]

## 2020-11-15 ENCOUNTER — HOSPITAL ENCOUNTER (EMERGENCY)
Dept: HOSPITAL 53 - M ED | Age: 55
Discharge: HOME | End: 2020-11-15
Payer: COMMERCIAL

## 2020-11-15 VITALS — HEIGHT: 63 IN | WEIGHT: 118.39 LBS | BODY MASS INDEX: 20.98 KG/M2

## 2020-11-15 VITALS — DIASTOLIC BLOOD PRESSURE: 85 MMHG | SYSTOLIC BLOOD PRESSURE: 162 MMHG

## 2020-11-15 DIAGNOSIS — Z79.899: ICD-10-CM

## 2020-11-15 DIAGNOSIS — Z79.1: ICD-10-CM

## 2020-11-15 DIAGNOSIS — K21.9: Primary | ICD-10-CM

## 2020-11-15 DIAGNOSIS — Z88.5: ICD-10-CM

## 2020-11-15 DIAGNOSIS — K80.50: ICD-10-CM

## 2020-11-15 DIAGNOSIS — Z88.8: ICD-10-CM

## 2020-11-15 DIAGNOSIS — F12.10: ICD-10-CM

## 2020-11-15 DIAGNOSIS — Z79.891: ICD-10-CM

## 2020-11-15 LAB
ALBUMIN SERPL BCG-MCNC: 4.6 GM/DL (ref 3.2–5.2)
ALT SERPL W P-5'-P-CCNC: 22 U/L (ref 12–78)
BASOPHILS # BLD AUTO: 0 10^3/UL (ref 0–0.2)
BASOPHILS NFR BLD AUTO: 0.2 % (ref 0–1)
BILIRUB CONJ SERPL-MCNC: 0.2 MG/DL (ref 0–0.2)
BILIRUB SERPL-MCNC: 0.7 MG/DL (ref 0.2–1)
BUN SERPL-MCNC: 9 MG/DL (ref 7–18)
CALCIUM SERPL-MCNC: 9.9 MG/DL (ref 8.5–10.1)
CHLORIDE SERPL-SCNC: 101 MEQ/L (ref 98–107)
CO2 SERPL-SCNC: 27 MEQ/L (ref 21–32)
CREAT SERPL-MCNC: 0.74 MG/DL (ref 0.55–1.3)
EOSINOPHIL # BLD AUTO: 0 10^3/UL (ref 0–0.5)
EOSINOPHIL NFR BLD AUTO: 0.2 % (ref 0–3)
GFR SERPL CREATININE-BSD FRML MDRD: > 60 ML/MIN/{1.73_M2} (ref 51–?)
GLUCOSE SERPL-MCNC: 104 MG/DL (ref 70–100)
HCT VFR BLD AUTO: 43 % (ref 36–47)
HGB BLD-MCNC: 14.6 G/DL (ref 12–15.5)
LIPASE SERPL-CCNC: 114 U/L (ref 73–393)
LYMPHOCYTES # BLD AUTO: 1.4 10^3/UL (ref 1.5–5)
LYMPHOCYTES NFR BLD AUTO: 9.7 % (ref 24–44)
MCH RBC QN AUTO: 31.6 PG (ref 27–33)
MCHC RBC AUTO-ENTMCNC: 34 G/DL (ref 32–36.5)
MCV RBC AUTO: 93.1 FL (ref 80–96)
MONOCYTES # BLD AUTO: 0.5 10^3/UL (ref 0–0.8)
MONOCYTES NFR BLD AUTO: 3.6 % (ref 0–5)
NEUTROPHILS # BLD AUTO: 12.3 10^3/UL (ref 1.5–8.5)
NEUTROPHILS NFR BLD AUTO: 85.9 % (ref 36–66)
PLATELET # BLD AUTO: 346 10^3/UL (ref 150–450)
POTASSIUM SERPL-SCNC: 3.3 MEQ/L (ref 3.5–5.1)
PROT SERPL-MCNC: 7.8 GM/DL (ref 6.4–8.2)
RBC # BLD AUTO: 4.62 10^6/UL (ref 4–5.4)
SODIUM SERPL-SCNC: 139 MEQ/L (ref 136–145)
WBC # BLD AUTO: 14.3 10^3/UL (ref 4–10)

## 2020-11-15 PROCEDURE — 96375 TX/PRO/DX INJ NEW DRUG ADDON: CPT

## 2020-11-15 PROCEDURE — 96361 HYDRATE IV INFUSION ADD-ON: CPT

## 2020-11-15 PROCEDURE — 80076 HEPATIC FUNCTION PANEL: CPT

## 2020-11-15 PROCEDURE — 76705 ECHO EXAM OF ABDOMEN: CPT

## 2020-11-15 PROCEDURE — 80048 BASIC METABOLIC PNL TOTAL CA: CPT

## 2020-11-15 PROCEDURE — 81001 URINALYSIS AUTO W/SCOPE: CPT

## 2020-11-15 PROCEDURE — 85025 COMPLETE CBC W/AUTO DIFF WBC: CPT

## 2020-11-15 PROCEDURE — 96374 THER/PROPH/DIAG INJ IV PUSH: CPT

## 2020-11-15 PROCEDURE — 99284 EMERGENCY DEPT VISIT MOD MDM: CPT

## 2020-11-15 PROCEDURE — 83690 ASSAY OF LIPASE: CPT

## 2020-11-15 NOTE — REPVR
PROCEDURE INFORMATION: 

Exam: US Abdomen, Limited; Right Upper Quadrant 

Exam date and time: 11/15/2020 5:26 PM 

Age: 55 years old 

Clinical indication: Abdominal pain; Flank; Right upper quadrant (ruq); 

Additional info: Ruq pain/vomiting, nausea 



TECHNIQUE: 

Imaging protocol: US abdomen. Real time ultrasound with image documentation. 

Limited exam focused on the right upper quadrant. 



COMPARISON: 

CT ABD PELVIS WITH CONTRAST 7/5/2016 11:24 AM 



FINDINGS: 

Liver: Diffuse hepatic steatosis. No focal hepatic lesion or intrahepatic duct 

dilatation. 

Gallbladder: 6 mm stone in the gallbladder. No gallbladder wall thickening or 

pericholecystic fluid. 

Common bile duct: Normal caliber common bile duct with a diameter of 6.2 mm. 

Pancreas: Visualized pancreas is unremarkable. 

Right kidney: Normal. No mass. No hydronephrosis. 



IMPRESSION: 

1. Cholelithiasis. 

2. Diffuse hepatic steatosis. 



Electronically signed by: Chong Lopez On 11/15/2020  18:04:27 PM

## 2021-01-25 ENCOUNTER — HOSPITAL ENCOUNTER (OUTPATIENT)
Dept: HOSPITAL 53 - M WHC | Age: 56
End: 2021-01-25
Attending: NURSE PRACTITIONER
Payer: COMMERCIAL

## 2021-01-25 DIAGNOSIS — Z92.0: ICD-10-CM

## 2021-01-25 DIAGNOSIS — Z12.31: Primary | ICD-10-CM

## 2021-01-25 NOTE — REPMRS
Patient History

The patient states she had a clinical breast exam in November 2020.

Family history of breast cancer in maternal grandmother, 

endometrial cancer in maternal aunt.

Took hormonal contraceptives for 23 years.  Took estrogen for 1 

year 6 months.

 

Digital Woman Screen Mammo: January 25, 2021 - Exam #: 

[id number]

Bilateral CC and MLO view(s) were taken.

 

Technologist: Beti Roberts, Technologist

Prior study comparison: January 22, 2020, bilateral digital woman

screen mammo performed at [facility].  October 30, 2018, bilateral digital woman screen 

mammo performed at [facility].  August 24, 2017, digital woman screen mammo performed at

[facility].

 

FINDINGS: The breast tissue is heterogeneously dense.  This may 

lower the sensitivity of mammography.  The Volpara volumetric 

breast density category is: C.  There is a moderate amount of 

heterogeneously dense fibroglandular tissue which is fairly 

symmetric. There is no interval development of dominant mass, 

architectural distortion, or grouped microcalcification typical 

of malignancy. There has been no change in the appearance of the 

mammogram from the prior studies.

3-D tomosynthesis shows no additional findings.

 

Assessment: BI-RADS/ACR category 1 mammogram. Negative Mammogram.

 

Recommendation

Routine screening mammogram of both breasts in 1 year (for women 

over age 40).

This patient's [facility] Lifetime Breast Cancer RIsk is 

estimated at 15.9 %.

This mammogram was interpreted with the aid of an FDA-approved 

computer-aided dectection system.

 

Electronically Signed By: Heri Worthy MD 01/25/21 [id number]

## 2021-02-13 ENCOUNTER — HOSPITAL ENCOUNTER (OUTPATIENT)
Dept: HOSPITAL 53 - M LABSMTC | Age: 56
End: 2021-02-13
Attending: ANESTHESIOLOGY
Payer: COMMERCIAL

## 2021-02-13 DIAGNOSIS — M54.5: ICD-10-CM

## 2021-02-13 DIAGNOSIS — Z20.822: ICD-10-CM

## 2021-02-13 DIAGNOSIS — Z01.812: Primary | ICD-10-CM

## 2021-02-28 ENCOUNTER — HOSPITAL ENCOUNTER (OUTPATIENT)
Dept: HOSPITAL 53 - M LABSMTC | Age: 56
End: 2021-02-28
Attending: ANESTHESIOLOGY
Payer: COMMERCIAL

## 2021-02-28 DIAGNOSIS — Z11.59: Primary | ICD-10-CM

## 2021-03-04 ENCOUNTER — HOSPITAL ENCOUNTER (EMERGENCY)
Dept: HOSPITAL 53 - M ED | Age: 56
Discharge: HOME | End: 2021-03-04
Payer: COMMERCIAL

## 2021-03-04 VITALS — BODY MASS INDEX: 21.17 KG/M2 | HEIGHT: 63 IN | WEIGHT: 119.49 LBS

## 2021-03-04 VITALS — SYSTOLIC BLOOD PRESSURE: 131 MMHG | DIASTOLIC BLOOD PRESSURE: 80 MMHG

## 2021-03-04 DIAGNOSIS — Z88.8: ICD-10-CM

## 2021-03-04 DIAGNOSIS — L94.9: ICD-10-CM

## 2021-03-04 DIAGNOSIS — Z85.828: ICD-10-CM

## 2021-03-04 DIAGNOSIS — G44.209: Primary | ICD-10-CM

## 2021-03-04 DIAGNOSIS — F41.9: ICD-10-CM

## 2021-03-04 DIAGNOSIS — F12.10: ICD-10-CM

## 2021-03-04 DIAGNOSIS — Z79.899: ICD-10-CM

## 2021-03-04 DIAGNOSIS — F19.10: ICD-10-CM

## 2021-03-04 DIAGNOSIS — E05.00: ICD-10-CM

## 2021-03-04 DIAGNOSIS — K21.9: ICD-10-CM

## 2021-03-04 DIAGNOSIS — F32.9: ICD-10-CM

## 2021-03-04 PROCEDURE — 96361 HYDRATE IV INFUSION ADD-ON: CPT

## 2021-03-04 PROCEDURE — 99284 EMERGENCY DEPT VISIT MOD MDM: CPT

## 2021-03-04 PROCEDURE — 96374 THER/PROPH/DIAG INJ IV PUSH: CPT

## 2021-03-04 PROCEDURE — 80047 BASIC METABLC PNL IONIZED CA: CPT

## 2021-03-04 PROCEDURE — 96375 TX/PRO/DX INJ NEW DRUG ADDON: CPT

## 2021-03-04 PROCEDURE — 70450 CT HEAD/BRAIN W/O DYE: CPT

## 2021-03-04 NOTE — REP
INDICATION:

headache x7 days



COMPARISON:

None.



TECHNIQUE:

Axial noncontrast images from the skull base to the vertex with coronal reformations.



This CT examination was performed using the following dose reduction techniques:

Automated exposure control, adjustment of mA and/or kv according to the patient's

size, and use of iterative reconstruction technique.



FINDINGS:

The ventricles, sulci, and cisterns are normal in position and appearance. Gray-white

differentiation is maintained.  No acute intracranial hemorrhage, mass/mass effect,

pathology or trauma/injury.  No evidence for acute infarction.  No extra-axial fluid

collection.  Calvarium is intact.  Paranasal sinuses and mastoid air cells are clear.



IMPRESSION:

Normal noncontrast head CT.

No evidence for acute intracranial pathology or trauma/injury.





<Electronically signed by Cachorro Mcmanus > 03/04/21 [id number]

## 2021-04-17 ENCOUNTER — HOSPITAL ENCOUNTER (OUTPATIENT)
Dept: HOSPITAL 53 - M LABSMTC | Age: 56
End: 2021-04-17
Payer: COMMERCIAL

## 2021-04-17 DIAGNOSIS — M54.2: ICD-10-CM

## 2021-04-17 DIAGNOSIS — Z20.828: ICD-10-CM

## 2021-04-17 DIAGNOSIS — Z01.812: Primary | ICD-10-CM

## 2021-05-06 ENCOUNTER — HOSPITAL ENCOUNTER (OUTPATIENT)
Dept: HOSPITAL 53 - M LABSMTC | Age: 56
End: 2021-05-06
Attending: PEDIATRICS
Payer: SELF-PAY

## 2021-05-06 DIAGNOSIS — Z11.52: Primary | ICD-10-CM

## 2021-09-19 ENCOUNTER — HOSPITAL ENCOUNTER (EMERGENCY)
Dept: HOSPITAL 53 - M ED | Age: 56
LOS: 1 days | Discharge: HOME | End: 2021-09-20
Payer: SELF-PAY

## 2021-09-19 DIAGNOSIS — E03.9: ICD-10-CM

## 2021-09-19 DIAGNOSIS — F10.120: Primary | ICD-10-CM

## 2021-09-19 LAB
ALBUMIN SERPL BCG-MCNC: 4.4 GM/DL (ref 3.2–5.2)
ALT SERPL W P-5'-P-CCNC: 29 U/L (ref 12–78)
AMPHETAMINES UR QL SCN: NEGATIVE
APAP SERPL-MCNC: < 2 UG/ML (ref 10–30)
BARBITURATES UR QL SCN: NEGATIVE
BENZODIAZ UR QL SCN: NEGATIVE
BILIRUB CONJ SERPL-MCNC: 0.1 MG/DL (ref 0–0.2)
BILIRUB SERPL-MCNC: 0.4 MG/DL (ref 0.2–1)
BUN SERPL-MCNC: 10 MG/DL (ref 7–18)
BZE UR QL SCN: NEGATIVE
CALCIUM SERPL-MCNC: 8.9 MG/DL (ref 8.5–10.1)
CANNABINOIDS UR QL SCN: POSITIVE
CHLORIDE SERPL-SCNC: 103 MEQ/L (ref 98–107)
CO2 SERPL-SCNC: 31 MEQ/L (ref 21–32)
CREAT SERPL-MCNC: 0.76 MG/DL (ref 0.55–1.3)
ETHANOL SERPL-MCNC: 0.28 % (ref 0–0.01)
GFR SERPL CREATININE-BSD FRML MDRD: > 60 ML/MIN/{1.73_M2} (ref 51–?)
GLUCOSE SERPL-MCNC: 83 MG/DL (ref 70–100)
HCT VFR BLD AUTO: 42.9 % (ref 36–47)
HGB BLD-MCNC: 14.3 G/DL (ref 12–15.5)
MCH RBC QN AUTO: 32.8 PG (ref 27–33)
MCHC RBC AUTO-ENTMCNC: 33.3 G/DL (ref 32–36.5)
MCV RBC AUTO: 98.4 FL (ref 80–96)
METHADONE UR QL SCN: NEGATIVE
OPIATES UR QL SCN: NEGATIVE
PCP UR QL SCN: NEGATIVE
PLATELET # BLD AUTO: 310 10^3/UL (ref 150–450)
POTASSIUM SERPL-SCNC: 4.5 MEQ/L (ref 3.5–5.1)
PROT SERPL-MCNC: 7.6 GM/DL (ref 6.4–8.2)
RBC # BLD AUTO: 4.36 10^6/UL (ref 4–5.4)
SALICYLATES SERPL-MCNC: 2 MG/DL (ref 5–30)
SODIUM SERPL-SCNC: 138 MEQ/L (ref 136–145)
TSH SERPL DL<=0.005 MIU/L-ACNC: 12.8 UIU/ML (ref 0.36–3.74)
WBC # BLD AUTO: 9.5 10^3/UL (ref 4–10)

## 2021-09-20 VITALS — SYSTOLIC BLOOD PRESSURE: 142 MMHG | DIASTOLIC BLOOD PRESSURE: 68 MMHG
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
## 2021-10-06 ENCOUNTER — HOSPITAL ENCOUNTER (OUTPATIENT)
Dept: HOSPITAL 53 - M RAD | Age: 56
End: 2021-10-06
Attending: NURSE PRACTITIONER
Payer: COMMERCIAL

## 2021-10-06 DIAGNOSIS — M54.59: Primary | ICD-10-CM

## 2021-10-06 NOTE — REP
INDICATION:

CHRONIC LOW BACK PAIN.



COMPARISON:

08/16/2019



TECHNIQUE:

Five views



FINDINGS:

Since the last examination a minimal grade 1 L3 upon L4 spondylolisthesis has

developed.  Hypertrophic degenerative facet joint changes are seen at every level

bilaterally increased from the prior exam.  Vertebral body height is within normal

limits and unchanged.  There is minimal anterior lipping seen.  Bilateral marginal

osteophytosis is again seen at L2-3 status quo.  The pedicles are intact bilaterally.

There is unchanged mild disc space narrowing.





IMPRESSION:

Degenerative changes as described above.





<Electronically signed by Adryan Dalton > 10/06/21 [id number]

## 2021-10-06 NOTE — REP
INDICATION:

CHRONIC LOW BACK PAIN.



COMPARISON:

01/31/2020



TECHNIQUE:

AP and frog lateral views



FINDINGS:

The joint space is symmetric and well maintained.  There is no fracture, dislocation,

or subluxation.  There is no buttressing.



IMPRESSION:

Within normal limits and unchanged from the prior exam.





<Electronically signed by Adryan Dalton > 10/06/21 [id number]

## 2021-10-07 ENCOUNTER — HOSPITAL ENCOUNTER (OUTPATIENT)
Dept: HOSPITAL 53 - M SFHCADAM | Age: 56
End: 2021-10-07
Attending: FAMILY MEDICINE
Payer: COMMERCIAL

## 2021-10-07 DIAGNOSIS — E03.9: Primary | ICD-10-CM

## 2021-10-07 DIAGNOSIS — R76.8: ICD-10-CM

## 2021-10-07 DIAGNOSIS — M85.80: ICD-10-CM

## 2021-10-07 LAB
25(OH)D3 SERPL-MCNC: 50.6 NG/ML (ref 30–100)
ALBUMIN SERPL BCG-MCNC: 4.3 GM/DL (ref 3.2–5.2)
ALT SERPL W P-5'-P-CCNC: 26 U/L (ref 12–78)
BILIRUB SERPL-MCNC: 0.5 MG/DL (ref 0.2–1)
BUN SERPL-MCNC: 14 MG/DL (ref 7–18)
CALCIUM SERPL-MCNC: 9.4 MG/DL (ref 8.5–10.1)
CHLORIDE SERPL-SCNC: 102 MEQ/L (ref 98–107)
CHOLEST SERPL-MCNC: 193 MG/DL (ref ?–200)
CHOLEST/HDLC SERPL: 1.99 {RATIO} (ref ?–5)
CO2 SERPL-SCNC: 29 MEQ/L (ref 21–32)
CREAT SERPL-MCNC: 0.73 MG/DL (ref 0.55–1.3)
GFR SERPL CREATININE-BSD FRML MDRD: > 60 ML/MIN/{1.73_M2} (ref 51–?)
GLUCOSE SERPL-MCNC: 90 MG/DL (ref 70–100)
HCT VFR BLD AUTO: 44 % (ref 36–47)
HDLC SERPL-MCNC: 97 MG/DL (ref 40–?)
HGB BLD-MCNC: 14.7 G/DL (ref 12–15.5)
LDLC SERPL CALC-MCNC: 81 MG/DL (ref ?–100)
MCH RBC QN AUTO: 32.7 PG (ref 27–33)
MCHC RBC AUTO-ENTMCNC: 33.4 G/DL (ref 32–36.5)
MCV RBC AUTO: 98 FL (ref 80–96)
NONHDLC SERPL-MCNC: 96 MG/DL
PLATELET # BLD AUTO: 348 10^3/UL (ref 150–450)
POTASSIUM SERPL-SCNC: 4.5 MEQ/L (ref 3.5–5.1)
PROT SERPL-MCNC: 7.3 GM/DL (ref 6.4–8.2)
RBC # BLD AUTO: 4.49 10^6/UL (ref 4–5.4)
SODIUM SERPL-SCNC: 139 MEQ/L (ref 136–145)
T4 FREE SERPL-MCNC: 1.04 NG/DL (ref 0.76–1.46)
TRIGL SERPL-MCNC: 74 MG/DL (ref ?–150)
TSH SERPL DL<=0.005 MIU/L-ACNC: 8.57 UIU/ML (ref 0.36–3.74)
WBC # BLD AUTO: 9 10^3/UL (ref 4–10)

## 2022-03-02 ENCOUNTER — HOSPITAL ENCOUNTER (OUTPATIENT)
Dept: HOSPITAL 53 - M WHC | Age: 57
End: 2022-03-02
Attending: NURSE PRACTITIONER
Payer: COMMERCIAL

## 2022-03-02 DIAGNOSIS — Z12.31: Primary | ICD-10-CM

## 2022-09-11 ENCOUNTER — HOSPITAL ENCOUNTER (EMERGENCY)
Dept: HOSPITAL 53 - M ED | Age: 57
Discharge: HOME | End: 2022-09-11
Payer: COMMERCIAL

## 2022-09-11 VITALS — HEIGHT: 63 IN | BODY MASS INDEX: 20 KG/M2 | WEIGHT: 112.88 LBS

## 2022-09-11 VITALS — SYSTOLIC BLOOD PRESSURE: 123 MMHG | DIASTOLIC BLOOD PRESSURE: 70 MMHG

## 2022-09-11 DIAGNOSIS — Z88.8: ICD-10-CM

## 2022-09-11 DIAGNOSIS — M25.50: ICD-10-CM

## 2022-09-11 DIAGNOSIS — E03.9: ICD-10-CM

## 2022-09-11 DIAGNOSIS — F32.9: ICD-10-CM

## 2022-09-11 DIAGNOSIS — M32.9: ICD-10-CM

## 2022-09-11 DIAGNOSIS — M06.9: ICD-10-CM

## 2022-09-11 DIAGNOSIS — F41.9: ICD-10-CM
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
## 2022-09-11 DIAGNOSIS — Z79.899: ICD-10-CM

## 2022-09-11 DIAGNOSIS — Z79.890: ICD-10-CM

## 2022-09-11 DIAGNOSIS — F12.10: ICD-10-CM

## 2022-09-11 DIAGNOSIS — S73.109A: Primary | ICD-10-CM

## 2022-09-11 PROCEDURE — 96372 THER/PROPH/DIAG INJ SC/IM: CPT

## 2022-09-11 PROCEDURE — 72190 X-RAY EXAM OF PELVIS: CPT

## 2022-09-11 PROCEDURE — 99283 EMERGENCY DEPT VISIT LOW MDM: CPT

## 2022-09-11 PROCEDURE — 72110 X-RAY EXAM L-2 SPINE 4/>VWS: CPT

## 2022-09-21 ENCOUNTER — HOSPITAL ENCOUNTER (OUTPATIENT)
Dept: HOSPITAL 53 - M RAD | Age: 57
End: 2022-09-21
Attending: PHYSICIAN ASSISTANT
Payer: COMMERCIAL

## 2022-09-21 DIAGNOSIS — R07.81: Primary | ICD-10-CM

## 2022-10-19 ENCOUNTER — HOSPITAL ENCOUNTER (OUTPATIENT)
Dept: HOSPITAL 53 - M PLALAB | Age: 57
End: 2022-10-19
Attending: FAMILY MEDICINE
Payer: COMMERCIAL

## 2022-10-19 DIAGNOSIS — M99.01: ICD-10-CM

## 2022-10-19 DIAGNOSIS — M85.80: ICD-10-CM

## 2022-10-19 DIAGNOSIS — G44.209: ICD-10-CM

## 2022-10-19 DIAGNOSIS — E03.9: Primary | ICD-10-CM

## 2022-10-19 LAB
25(OH)D3 SERPL-MCNC: 63.5 NG/ML (ref 30–100)
ALBUMIN SERPL BCG-MCNC: 4.2 GM/DL (ref 3.2–5.2)
ALT SERPL W P-5'-P-CCNC: 23 U/L (ref 12–78)
BILIRUB SERPL-MCNC: 0.5 MG/DL (ref 0.2–1)
BUN SERPL-MCNC: 13 MG/DL (ref 7–18)
CALCIUM SERPL-MCNC: 9.5 MG/DL (ref 8.5–10.1)
CHLORIDE SERPL-SCNC: 104 MEQ/L (ref 98–107)
CHOLEST SERPL-MCNC: 207 MG/DL (ref ?–200)
CHOLEST/HDLC SERPL: 2.09 {RATIO} (ref ?–5)
CO2 SERPL-SCNC: 29 MEQ/L (ref 21–32)
CREAT SERPL-MCNC: 0.72 MG/DL (ref 0.55–1.3)
GFR SERPL CREATININE-BSD FRML MDRD: > 60 ML/MIN/{1.73_M2} (ref 51–?)
GLUCOSE SERPL-MCNC: 92 MG/DL (ref 70–100)
HCT VFR BLD AUTO: 44.3 % (ref 36–47)
HDLC SERPL-MCNC: 99 MG/DL (ref 40–?)
HGB BLD-MCNC: 14.3 G/DL (ref 12–15.5)
LDLC SERPL CALC-MCNC: 93 MG/DL (ref ?–100)
MCH RBC QN AUTO: 31.6 PG (ref 27–33)
MCHC RBC AUTO-ENTMCNC: 32.3 G/DL (ref 32–36.5)
MCV RBC AUTO: 98 FL (ref 80–96)
NONHDLC SERPL-MCNC: 108 MG/DL
PLATELET # BLD AUTO: 323 10^3/UL (ref 150–450)
POTASSIUM SERPL-SCNC: 4.3 MEQ/L (ref 3.5–5.1)
PROT SERPL-MCNC: 7.2 GM/DL (ref 6.4–8.2)
RBC # BLD AUTO: 4.52 10^6/UL (ref 4–5.4)
SODIUM SERPL-SCNC: 139 MEQ/L (ref 136–145)
T4 FREE SERPL-MCNC: 1.16 NG/DL (ref 0.76–1.46)
TRIGL SERPL-MCNC: 77 MG/DL (ref ?–150)
TSH SERPL DL<=0.005 MIU/L-ACNC: 1.43 UIU/ML (ref 0.36–3.74)
WBC # BLD AUTO: 12.2 10^3/UL (ref 4–10)

## 2022-11-10 ENCOUNTER — HOSPITAL ENCOUNTER (OUTPATIENT)
Dept: HOSPITAL 53 - M WHC | Age: 57
End: 2022-11-10
Attending: INTERNAL MEDICINE
Payer: COMMERCIAL

## 2022-11-10 DIAGNOSIS — M85.89: Primary | ICD-10-CM

## 2023-01-10 ENCOUNTER — HOSPITAL ENCOUNTER (OUTPATIENT)
Dept: HOSPITAL 53 - M SFHCADAM | Age: 58
End: 2023-01-10
Attending: FAMILY MEDICINE
Payer: COMMERCIAL

## 2023-01-10 ENCOUNTER — HOSPITAL ENCOUNTER (OUTPATIENT)
Dept: HOSPITAL 53 - M ADAMS | Age: 58
End: 2023-01-10
Attending: FAMILY MEDICINE
Payer: COMMERCIAL

## 2023-01-10 DIAGNOSIS — R05.3: ICD-10-CM

## 2023-01-10 DIAGNOSIS — R06.09: Primary | ICD-10-CM

## 2023-01-10 DIAGNOSIS — U09.9: ICD-10-CM

## 2023-01-10 LAB
ALBUMIN SERPL BCG-MCNC: 4.1 G/DL (ref 3.2–5.2)
ALP SERPL-CCNC: 55 U/L (ref 46–116)
ALT SERPL W P-5'-P-CCNC: 19 U/L (ref 7–40)
AST SERPL-CCNC: 29 U/L (ref ?–34)
BILIRUB SERPL-MCNC: 0.4 MG/DL (ref 0.3–1.2)
BUN SERPL-MCNC: 14 MG/DL (ref 9–23)
CALCIUM SERPL-MCNC: 9.6 MG/DL (ref 8.5–10.1)
CHLORIDE SERPL-SCNC: 104 MMOL/L (ref 98–107)
CO2 SERPL-SCNC: 28 MMOL/L (ref 20–31)
CREAT SERPL-MCNC: 0.6 MG/DL (ref 0.55–1.3)
CRP SERPL-MCNC: < 0.4 MG/DL (ref ?–1)
GFR SERPL CREATININE-BSD FRML MDRD: > 60 ML/MIN/{1.73_M2} (ref 51–?)
GLUCOSE SERPL-MCNC: 94 MG/DL (ref 60–100)
HCT VFR BLD AUTO: 44.5 % (ref 36–47)
HGB BLD-MCNC: 14.4 G/DL (ref 12–15.5)
MCH RBC QN AUTO: 31.2 PG (ref 27–33)
MCHC RBC AUTO-ENTMCNC: 32.4 G/DL (ref 32–36.5)
MCV RBC AUTO: 96.5 FL (ref 80–96)
PLATELET # BLD AUTO: 319 10^3/UL (ref 150–450)
POTASSIUM SERPL-SCNC: 4.5 MMOL/L (ref 3.5–5.1)
PROT SERPL-MCNC: 6.9 G/DL (ref 5.7–8.2)
RBC # BLD AUTO: 4.61 10^6/UL (ref 4–5.4)
SODIUM SERPL-SCNC: 140 MMOL/L (ref 136–145)
WBC # BLD AUTO: 8.4 10^3/UL (ref 4–10)

## 2023-01-11 ENCOUNTER — HOSPITAL ENCOUNTER (OUTPATIENT)
Dept: HOSPITAL 53 - M RAD | Age: 58
End: 2023-01-11
Attending: FAMILY MEDICINE
Payer: COMMERCIAL

## 2023-01-11 DIAGNOSIS — R06.09: ICD-10-CM

## 2023-01-11 DIAGNOSIS — M51.26: ICD-10-CM

## 2023-01-11 DIAGNOSIS — R05.3: Primary | ICD-10-CM

## 2023-01-11 DIAGNOSIS — I70.0: ICD-10-CM

## 2023-01-11 DIAGNOSIS — U09.9: ICD-10-CM

## 2023-01-11 DIAGNOSIS — R79.89: ICD-10-CM

## 2023-03-16 ENCOUNTER — HOSPITAL ENCOUNTER (OUTPATIENT)
Dept: HOSPITAL 53 - M PLALAB | Age: 58
End: 2023-03-16
Attending: INTERNAL MEDICINE
Payer: COMMERCIAL

## 2023-03-16 ENCOUNTER — HOSPITAL ENCOUNTER (OUTPATIENT)
Dept: HOSPITAL 53 - M WHC | Age: 58
End: 2023-03-16
Attending: FAMILY MEDICINE
Payer: COMMERCIAL

## 2023-03-16 DIAGNOSIS — Z12.31: Primary | ICD-10-CM

## 2023-03-16 DIAGNOSIS — E55.9: Primary | ICD-10-CM

## 2023-03-16 DIAGNOSIS — M85.80: ICD-10-CM

## 2023-09-21 ENCOUNTER — HOSPITAL ENCOUNTER (OUTPATIENT)
Dept: HOSPITAL 53 - M LAB | Age: 58
End: 2023-09-21
Attending: INTERNAL MEDICINE
Payer: COMMERCIAL

## 2023-09-21 DIAGNOSIS — E89.0: ICD-10-CM

## 2023-09-21 DIAGNOSIS — E55.9: Primary | ICD-10-CM

## 2023-09-21 DIAGNOSIS — M80.00XD: ICD-10-CM

## 2023-09-21 LAB
25(OH)D3 SERPL-MCNC: 42.2 NG/ML (ref 20–100)
BUN SERPL-MCNC: 17 MG/DL (ref 9–23)
CALCIUM SERPL-MCNC: 10.3 MG/DL (ref 8.5–10.1)
CHLORIDE SERPL-SCNC: 101 MMOL/L (ref 98–107)
CO2 SERPL-SCNC: 28 MMOL/L (ref 20–31)
CREAT SERPL-MCNC: 0.62 MG/DL (ref 0.55–1.3)
GFR SERPL CREATININE-BSD FRML MDRD: > 60 ML/MIN/{1.73_M2} (ref 51–?)
GLUCOSE SERPL-MCNC: 78 MG/DL (ref 60–100)
POTASSIUM SERPL-SCNC: 4.5 MMOL/L (ref 3.5–5.1)
SODIUM SERPL-SCNC: 137 MMOL/L (ref 136–145)
T4 FREE SERPL-MCNC: 1.22 NG/DL (ref 0.89–1.76)
TSH SERPL DL<=0.005 MIU/L-ACNC: 3.12 UIU/ML (ref 0.55–4.78)

## 2023-11-09 ENCOUNTER — HOSPITAL ENCOUNTER (OUTPATIENT)
Dept: HOSPITAL 53 - M SFHCADAM | Age: 58
End: 2023-11-09
Attending: FAMILY MEDICINE
Payer: COMMERCIAL

## 2023-11-09 DIAGNOSIS — Z53.9: ICD-10-CM

## 2023-11-09 DIAGNOSIS — F43.22: ICD-10-CM

## 2023-11-09 DIAGNOSIS — J01.00: Primary | ICD-10-CM

## 2023-11-09 DIAGNOSIS — Z13.1: ICD-10-CM

## 2023-11-09 DIAGNOSIS — E03.9: ICD-10-CM

## 2024-02-22 ENCOUNTER — HOSPITAL ENCOUNTER (OUTPATIENT)
Dept: HOSPITAL 53 - M SFHCADAM | Age: 59
End: 2024-02-22
Attending: FAMILY MEDICINE
Payer: COMMERCIAL

## 2024-02-22 DIAGNOSIS — F43.22: ICD-10-CM

## 2024-02-22 DIAGNOSIS — J01.00: Primary | ICD-10-CM

## 2024-02-22 DIAGNOSIS — E03.9: ICD-10-CM

## 2024-02-22 DIAGNOSIS — Z13.1: ICD-10-CM

## 2024-02-22 LAB
ALBUMIN SERPL BCG-MCNC: 3.7 G/DL (ref 3.2–5.2)
ALP SERPL-CCNC: 68 U/L (ref 46–116)
ALT SERPL W P-5'-P-CCNC: 22 U/L (ref 7–40)
AST SERPL-CCNC: 20 U/L (ref ?–34)
BILIRUB SERPL-MCNC: 0.3 MG/DL (ref 0.3–1.2)
BUN SERPL-MCNC: 20 MG/DL (ref 9–23)
CALCIUM SERPL-MCNC: 9.4 MG/DL (ref 8.5–10.1)
CHLORIDE SERPL-SCNC: 106 MMOL/L (ref 98–107)
CHOLEST SERPL-MCNC: 190 MG/DL (ref ?–200)
CHOLEST/HDLC SERPL: 2.12 {RATIO} (ref ?–5)
CO2 SERPL-SCNC: 30 MMOL/L (ref 20–31)
CREAT SERPL-MCNC: 0.76 MG/DL (ref 0.55–1.3)
EST. AVERAGE GLUCOSE BLD GHB EST-MCNC: 111 MG/DL (ref 60–110)
GFR SERPL CREATININE-BSD FRML MDRD: > 60 ML/MIN/{1.73_M2} (ref 51–?)
GLUCOSE SERPL-MCNC: 91 MG/DL (ref 60–100)
HCT VFR BLD AUTO: 41.4 % (ref 36–47)
HDLC SERPL-MCNC: 89.5 MG/DL (ref 40–?)
HGB BLD-MCNC: 13.6 G/DL (ref 12–15.5)
LDLC SERPL CALC-MCNC: 77.1 MG/DL (ref ?–100)
MCH RBC QN AUTO: 33.5 PG (ref 27–33)
MCHC RBC AUTO-ENTMCNC: 32.9 G/DL (ref 32–36.5)
MCV RBC AUTO: 102 FL (ref 80–96)
NONHDLC SERPL-MCNC: 100.5 MG/DL
PLATELET # BLD AUTO: 307 10^3/UL (ref 150–450)
POTASSIUM SERPL-SCNC: 4.4 MMOL/L (ref 3.5–5.1)
PROT SERPL-MCNC: 6.5 G/DL (ref 5.7–8.2)
RBC # BLD AUTO: 4.06 10^6/UL (ref 4–5.4)
SODIUM SERPL-SCNC: 140 MMOL/L (ref 136–145)
T4 FREE SERPL-MCNC: 1.21 NG/DL (ref 0.89–1.76)
TRIGL SERPL-MCNC: 117 MG/DL (ref ?–150)
TSH SERPL DL<=0.005 MIU/L-ACNC: 0.81 UIU/ML (ref 0.55–4.78)
WBC # BLD AUTO: 8.1 10^3/UL (ref 4–10)

## 2024-03-18 ENCOUNTER — HOSPITAL ENCOUNTER (OUTPATIENT)
Dept: HOSPITAL 53 - M WHC | Age: 59
End: 2024-03-18
Attending: PHYSICIAN ASSISTANT
Payer: COMMERCIAL

## 2024-03-18 DIAGNOSIS — Z12.31: Primary | ICD-10-CM

## 2024-03-18 DIAGNOSIS — R92.333: ICD-10-CM
